# Patient Record
Sex: FEMALE | Race: WHITE | NOT HISPANIC OR LATINO | Employment: UNEMPLOYED | URBAN - NONMETROPOLITAN AREA
[De-identification: names, ages, dates, MRNs, and addresses within clinical notes are randomized per-mention and may not be internally consistent; named-entity substitution may affect disease eponyms.]

---

## 2017-02-04 ENCOUNTER — HOSPITAL ENCOUNTER (OUTPATIENT)
Facility: HOSPITAL | Age: 52
Setting detail: OBSERVATION
Discharge: PSYCHIATRIC OR SUBSTANCE USE FACILITY | End: 2017-02-05
Attending: EMERGENCY MEDICINE | Admitting: HOSPITALIST

## 2017-02-04 ENCOUNTER — APPOINTMENT (OUTPATIENT)
Dept: CT IMAGING | Facility: HOSPITAL | Age: 52
End: 2017-02-04

## 2017-02-04 DIAGNOSIS — T50.904A OVERDOSE, UNDETERMINED INTENT, INITIAL ENCOUNTER: Primary | ICD-10-CM

## 2017-02-04 PROBLEM — T50.901A OVERDOSE: Status: ACTIVE | Noted: 2017-02-04

## 2017-02-04 LAB
ALBUMIN SERPL-MCNC: 4.2 G/DL (ref 3.4–4.8)
ALBUMIN/GLOB SERPL: 1.4 G/DL (ref 1.1–1.8)
ALP SERPL-CCNC: 85 U/L (ref 38–126)
ALT SERPL W P-5'-P-CCNC: 27 U/L (ref 9–52)
AMPHET+METHAMPHET UR QL: NEGATIVE
ANION GAP SERPL CALCULATED.3IONS-SCNC: 13 MMOL/L (ref 5–15)
APAP SERPL-MCNC: <10 MCG/ML (ref 10–30)
AST SERPL-CCNC: 28 U/L (ref 14–36)
BARBITURATES UR QL SCN: NEGATIVE
BASOPHILS # BLD AUTO: 0.08 10*3/MM3 (ref 0–0.2)
BASOPHILS NFR BLD AUTO: 0.7 % (ref 0–2)
BENZODIAZ UR QL SCN: POSITIVE
BILIRUB SERPL-MCNC: 0.5 MG/DL (ref 0.2–1.3)
BUN BLD-MCNC: 9 MG/DL (ref 7–21)
BUN/CREAT SERPL: 10.2 (ref 7–25)
CALCIUM SPEC-SCNC: 9 MG/DL (ref 8.4–10.2)
CANNABINOIDS SERPL QL: NEGATIVE
CHLORIDE SERPL-SCNC: 98 MMOL/L (ref 95–110)
CO2 SERPL-SCNC: 23 MMOL/L (ref 22–31)
COCAINE UR QL: NEGATIVE
CREAT BLD-MCNC: 0.88 MG/DL (ref 0.5–1)
DEPRECATED RDW RBC AUTO: 43.5 FL (ref 36.4–46.3)
EOSINOPHIL # BLD AUTO: 0.24 10*3/MM3 (ref 0–0.7)
EOSINOPHIL NFR BLD AUTO: 2.2 % (ref 0–7)
ERYTHROCYTE [DISTWIDTH] IN BLOOD BY AUTOMATED COUNT: 13.7 % (ref 11.5–14.5)
ETHANOL BLD-MCNC: <10 MG/DL (ref 0–0)
ETHANOL UR QL: <0.01 %
GFR SERPL CREATININE-BSD FRML MDRD: 68 ML/MIN/1.73 (ref 51–120)
GLOBULIN UR ELPH-MCNC: 3 GM/DL (ref 2.3–3.5)
GLUCOSE BLD-MCNC: 110 MG/DL (ref 60–100)
HCT VFR BLD AUTO: 36.8 % (ref 35–45)
HGB BLD-MCNC: 13.2 G/DL (ref 12–15.5)
HOLD SPECIMEN: NORMAL
HOLD SPECIMEN: NORMAL
IMM GRANULOCYTES # BLD: 0.04 10*3/MM3 (ref 0–0.02)
IMM GRANULOCYTES NFR BLD: 0.4 % (ref 0–0.5)
LYMPHOCYTES # BLD AUTO: 2.64 10*3/MM3 (ref 0.6–4.2)
LYMPHOCYTES NFR BLD AUTO: 24.4 % (ref 10–50)
MCH RBC QN AUTO: 31.1 PG (ref 26.5–34)
MCHC RBC AUTO-ENTMCNC: 35.9 G/DL (ref 31.4–36)
MCV RBC AUTO: 86.8 FL (ref 80–98)
METHADONE UR QL SCN: NEGATIVE
MONOCYTES # BLD AUTO: 0.67 10*3/MM3 (ref 0–0.9)
MONOCYTES NFR BLD AUTO: 6.2 % (ref 0–12)
NEUTROPHILS # BLD AUTO: 7.17 10*3/MM3 (ref 2–8.6)
NEUTROPHILS NFR BLD AUTO: 66.1 % (ref 37–80)
OPIATES UR QL: NEGATIVE
OXYCODONE UR QL SCN: NEGATIVE
PLATELET # BLD AUTO: 224 10*3/MM3 (ref 150–450)
PMV BLD AUTO: 11.4 FL (ref 8–12)
POTASSIUM BLD-SCNC: 3.5 MMOL/L (ref 3.5–5.1)
PROT SERPL-MCNC: 7.2 G/DL (ref 6.3–8.6)
RBC # BLD AUTO: 4.24 10*6/MM3 (ref 3.77–5.16)
SALICYLATES SERPL-MCNC: <1 MG/DL (ref 10–20)
SODIUM BLD-SCNC: 134 MMOL/L (ref 137–145)
WBC NRBC COR # BLD: 10.84 10*3/MM3 (ref 3.2–9.8)
WHOLE BLOOD HOLD SPECIMEN: NORMAL
WHOLE BLOOD HOLD SPECIMEN: NORMAL

## 2017-02-04 PROCEDURE — G0378 HOSPITAL OBSERVATION PER HR: HCPCS

## 2017-02-04 PROCEDURE — 96361 HYDRATE IV INFUSION ADD-ON: CPT

## 2017-02-04 PROCEDURE — 96360 HYDRATION IV INFUSION INIT: CPT

## 2017-02-04 PROCEDURE — 70450 CT HEAD/BRAIN W/O DYE: CPT

## 2017-02-04 PROCEDURE — 80307 DRUG TEST PRSMV CHEM ANLYZR: CPT | Performed by: EMERGENCY MEDICINE

## 2017-02-04 PROCEDURE — 85025 COMPLETE CBC W/AUTO DIFF WBC: CPT | Performed by: EMERGENCY MEDICINE

## 2017-02-04 PROCEDURE — 80053 COMPREHEN METABOLIC PANEL: CPT | Performed by: EMERGENCY MEDICINE

## 2017-02-04 PROCEDURE — 94770: CPT

## 2017-02-04 PROCEDURE — 81001 URINALYSIS AUTO W/SCOPE: CPT | Performed by: EMERGENCY MEDICINE

## 2017-02-04 PROCEDURE — 87077 CULTURE AEROBIC IDENTIFY: CPT | Performed by: EMERGENCY MEDICINE

## 2017-02-04 PROCEDURE — 99285 EMERGENCY DEPT VISIT HI MDM: CPT

## 2017-02-04 PROCEDURE — 87186 SC STD MICRODIL/AGAR DIL: CPT | Performed by: EMERGENCY MEDICINE

## 2017-02-04 PROCEDURE — 87086 URINE CULTURE/COLONY COUNT: CPT | Performed by: EMERGENCY MEDICINE

## 2017-02-04 PROCEDURE — P9612 CATHETERIZE FOR URINE SPEC: HCPCS

## 2017-02-04 RX ORDER — SODIUM CHLORIDE 0.9 % (FLUSH) 0.9 %
10 SYRINGE (ML) INJECTION AS NEEDED
Status: DISCONTINUED | OUTPATIENT
Start: 2017-02-04 | End: 2017-02-05 | Stop reason: SDUPTHER

## 2017-02-04 RX ORDER — SODIUM CHLORIDE 9 MG/ML
125 INJECTION, SOLUTION INTRAVENOUS CONTINUOUS
Status: DISCONTINUED | OUTPATIENT
Start: 2017-02-04 | End: 2017-02-05 | Stop reason: HOSPADM

## 2017-02-04 RX ADMIN — SODIUM CHLORIDE 125 ML/HR: 9 INJECTION, SOLUTION INTRAVENOUS at 17:59

## 2017-02-04 NOTE — ED NOTES
Pt presents to the ED with AMS.  Family reported to EMS that they found pt on floor incontinent of urine. Family reports that she may have overdosed on some medicines.  Pt had 30 xanax filled on the January 31st and 30 Somas filled on February 3rd.  Pt has 4 Somas left and the Xanax bottle was empty.      Jyoti Garcia RN  02/04/17 4563

## 2017-02-04 NOTE — ED PROVIDER NOTES
Subjective   Patient is a 51 y.o. female presenting with ingestion.   Ingestion   Ingested substance:  Prescription medication  Time since incident: exact time unknown, but occurred today as pt found on the floor of her house incontinent by family.  Witnesses present: no    Called poison control: no    Incident location:  Home  Context: intentional ingestion    Context comment:  Pt was found on the floor of her house by family , unresponsive and incontinent of urine  Associated symptoms: altered mental status, lethargy, slurred speech and unresponsiveness    Associated symptoms: no agitation, no diaphoresis, no nausea, no shortness of breath and no vomiting        Review of Systems   Constitutional: Positive for activity change. Negative for diaphoresis.   HENT: Negative.    Eyes: Negative.    Respiratory: Negative.  Negative for shortness of breath.    Cardiovascular: Negative.    Gastrointestinal: Negative.  Negative for nausea and vomiting.   Musculoskeletal: Negative.    Skin: Negative.    Neurological: Positive for speech difficulty.   Psychiatric/Behavioral: Positive for confusion. Negative for agitation and suicidal ideas.       Past Medical History   Diagnosis Date   • Anxiety    • Depression    • Hypertension    • Menopause        Allergies   Allergen Reactions   • Demerol [Meperidine]    • Tramadol        Past Surgical History   Procedure Laterality Date   • Tonsillectomy     • Tubal abdominal ligation     • Bladder surgery       bladder stretched as a child       Family History   Problem Relation Age of Onset   • Chronic infections Mother      became delirious--septic   • Diabetes Mother    • Bipolar disorder Mother    • Transient ischemic attack [facility]      [facility] [place]   • Alcohol abuse Father    • Seizures Father    • Dementia Father    • Cancer Maternal Grandmother    • Stroke Paternal Grandmother    • Diabetes Paternal Grandmother    • Heart disease Paternal Grandfather    • Lung cancer  Maternal Uncle    • Lung cancer Maternal Aunt    • Drug abuse Sister      Accidental overdose       Social History     Social History   • Marital status:      Spouse name: Randy   • Number of children: 1   • Years of education: 12     Occupational History   • disabled      Social History Main Topics   • Smoking status: Current Every Day Smoker     Packs/day: 1.00     Years: 36.00     Types: Cigarettes   • Smokeless tobacco: Never Used   • Alcohol use No      Comment: Last use of alcohol 2006   • Drug use: No      Comment: Marijuana and amphetamines in high school . In high school took grandfather's valium.   • Sexual activity: Yes     Partners: Male     Other Topics Concern   • None     Social History Narrative    Has had one AI and one DUI           Objective   Physical Exam   Constitutional: She appears well-developed and well-nourished. She appears lethargic.   HENT:   Head: Normocephalic and atraumatic.   Mouth/Throat: Oropharynx is clear and moist.   Eyes: Conjunctivae and EOM are normal. Pupils are equal, round, and reactive to light.   Neck: Normal range of motion. Neck supple.   Cardiovascular: Normal rate, regular rhythm and normal heart sounds.  Exam reveals no gallop and no friction rub.    No murmur heard.  Pulmonary/Chest: Effort normal and breath sounds normal. She has no wheezes. She has no rales.   Abdominal: Soft. Bowel sounds are normal. She exhibits no mass. There is no tenderness. There is no rebound and no guarding.   Musculoskeletal: Normal range of motion. She exhibits no tenderness.   Neurological: She has normal strength and normal reflexes. She appears lethargic. She is disoriented. She displays tremor. A sensory deficit is present. No cranial nerve deficit. Coordination and gait abnormal. GCS eye subscore is 4. GCS verbal subscore is 3. GCS motor subscore is 5.   Skin: Skin is warm and dry.   Psychiatric: Her affect is blunt. Her speech is slurred. She is inattentive.   Nursing  note and vitals reviewed.      Procedures         ED Course  ED Course     Labs Reviewed   COMPREHENSIVE METABOLIC PANEL - Abnormal; Notable for the following:        Result Value    Glucose 110 (*)     Sodium 134 (*)     All other components within normal limits   ACETAMINOPHEN LEVEL - Abnormal; Notable for the following:     Acetaminophen <10.0 (*)     All other components within normal limits   ETHANOL - Abnormal; Notable for the following:     Ethanol <10 (*)     All other components within normal limits   SALICYLATE LEVEL - Abnormal; Notable for the following:     Salicylate <1.0 (*)     All other components within normal limits   URINE DRUG SCREEN - Abnormal; Notable for the following:     Benzodiazepine Screen, Urine Positive (*)     All other components within normal limits    Narrative:     Negative Thresholds For Drugs Screened in Urine:     Amphetamines          500 ng/ml  Barbiturates          200 ng/ml  Benzodiazepines       200 ng/ml  Cocaine               150 ng/ml  Methadone             300 ng/mL  Opiates               300 ng/mL  Oxycodone             100 ng/mL  THC                   20 ng/mL    The normal value for all drugs tested is negative. This report includes final unconfirmed screening results.  A positive result by this assay can be, at your request, sent to the Reference Lab for confirmation by gas chromatography. Unconfirmed results must not be used for non-medical purposes, such as employment or legal testing. Clinical consideration should be applied to any drug of abuse test result, particularly when unconfirmed results are used.   CBC WITH AUTO DIFFERENTIAL - Abnormal; Notable for the following:     WBC 10.84 (*)     Immature Grans, Absolute 0.04 (*)     All other components within normal limits   URINALYSIS W/ CULTURE IF INDICATED - Abnormal; Notable for the following:     Appearance, UA Cloudy (*)     Ketones, UA Trace (*)     Bilirubin, UA Small (1+) (*)     Protein, UA Trace (*)      Leuk Esterase, UA Moderate (2+) (*)     Nitrite, UA Positive (*)     All other components within normal limits   BASIC METABOLIC PANEL - Abnormal; Notable for the following:     BUN 6 (*)     Potassium 2.9 (*)     Chloride 112 (*)     CO2 19.0 (*)     Calcium 6.2 (*)     All other components within normal limits   CBC WITH AUTO DIFFERENTIAL - Abnormal; Notable for the following:     RBC 3.45 (*)     Hemoglobin 10.5 (*)     Hematocrit 29.8 (*)     nRBC 0.3 (*)     All other components within normal limits   URINALYSIS, MICROSCOPIC ONLY - Abnormal; Notable for the following:     Bacteria, UA 2+ (*)     All other components within normal limits   SCAN SLIDE - Normal   URINE CULTURE   RAINBOW DRAW    Narrative:     The following orders were created for panel order [place] Draw.  Procedure                               Abnormality         Status                     ---------                               -----------         ------                     Light Blue Top[[id number]]                                    Final result               Green Top (Gel)[[id number]]                                   Final result               Lavender Top[[id number]]                                      Final result               Gold Top - SST[[id number]]                                    Final result                 Please view results for these tests on the individual orders.   CBC AND DIFFERENTIAL    Narrative:     The following orders were created for panel order CBC & Differential.  Procedure                               Abnormality         Status                     ---------                               -----------         ------                     CBC Auto Differential[[id number]]         Abnormal            Final result                 Please view results for these tests on the individual orders.   LIGHT BLUE TOP   GREEN TOP   LAVENDER TOP   GOLD TOP - SST       CT Head Without Contrast   Final Result   No CT evidence of acute  intracranial hemorrhage.      Electronically signed by:  Matilde Albert MD  2/4/2017 6:47 PM CST         spoke wit Dr Landeros, pt to be admitted for observation           MDM    Final diagnoses:   Overdose, undetermined intent, initial encounter            Lewis Felix MD  02/05/17 8584

## 2017-02-05 ENCOUNTER — HOSPITAL ENCOUNTER (INPATIENT)
Facility: HOSPITAL | Age: 52
LOS: 4 days | Discharge: HOME | End: 2017-02-09
Attending: HOSPITALIST | Admitting: PSYCHIATRY & NEUROLOGY

## 2017-02-05 VITALS
RESPIRATION RATE: 18 BRPM | WEIGHT: 187.25 LBS | HEART RATE: 79 BPM | OXYGEN SATURATION: 96 % | SYSTOLIC BLOOD PRESSURE: 140 MMHG | HEIGHT: 66 IN | TEMPERATURE: 97.9 F | DIASTOLIC BLOOD PRESSURE: 83 MMHG | BODY MASS INDEX: 30.09 KG/M2

## 2017-02-05 PROBLEM — T50.902A SUICIDAL OVERDOSE (HCC): Status: ACTIVE | Noted: 2017-02-05

## 2017-02-05 PROBLEM — F40.10 SOCIAL ANXIETY DISORDER: Status: ACTIVE | Noted: 2017-02-05

## 2017-02-05 PROBLEM — F33.2 SEVERE EPISODE OF RECURRENT MAJOR DEPRESSIVE DISORDER, WITHOUT PSYCHOTIC FEATURES (HCC): Status: ACTIVE | Noted: 2017-02-05

## 2017-02-05 LAB
ANION GAP SERPL CALCULATED.3IONS-SCNC: 6 MMOL/L (ref 5–15)
BACTERIA UR QL AUTO: ABNORMAL /HPF
BASOPHILS # BLD AUTO: 0.04 10*3/MM3 (ref 0–0.2)
BASOPHILS NFR BLD AUTO: 0.5 % (ref 0–2)
BILIRUB UR QL STRIP: ABNORMAL
BUN BLD-MCNC: 6 MG/DL (ref 7–21)
BUN/CREAT SERPL: 10 (ref 7–25)
CALCIUM SPEC-SCNC: 6.2 MG/DL (ref 8.4–10.2)
CHLORIDE SERPL-SCNC: 112 MMOL/L (ref 95–110)
CLARITY UR: ABNORMAL
CO2 SERPL-SCNC: 19 MMOL/L (ref 22–31)
COLOR UR: ABNORMAL
CREAT BLD-MCNC: 0.6 MG/DL (ref 0.5–1)
DEPRECATED RDW RBC AUTO: 43.5 FL (ref 36.4–46.3)
EOSINOPHIL # BLD AUTO: 0.18 10*3/MM3 (ref 0–0.7)
EOSINOPHIL NFR BLD AUTO: 2.3 % (ref 0–7)
ERYTHROCYTE [DISTWIDTH] IN BLOOD BY AUTOMATED COUNT: 13.7 % (ref 11.5–14.5)
GFR SERPL CREATININE-BSD FRML MDRD: 105 ML/MIN/1.73 (ref 60–120)
GLUCOSE BLD-MCNC: 69 MG/DL (ref 60–100)
GLUCOSE UR STRIP-MCNC: NEGATIVE MG/DL
HCT VFR BLD AUTO: 29.8 % (ref 35–45)
HGB BLD-MCNC: 10.5 G/DL (ref 12–15.5)
HGB UR QL STRIP.AUTO: NEGATIVE
HYALINE CASTS UR QL AUTO: ABNORMAL /LPF
IMM GRANULOCYTES # BLD: 0.02 10*3/MM3 (ref 0–0.02)
IMM GRANULOCYTES NFR BLD: 0.3 % (ref 0–0.5)
KETONES UR QL STRIP: ABNORMAL
LEUKOCYTE ESTERASE UR QL STRIP.AUTO: ABNORMAL
LYMPHOCYTES # BLD AUTO: 1.51 10*3/MM3 (ref 0.6–4.2)
LYMPHOCYTES NFR BLD AUTO: 18.9 % (ref 10–50)
MCH RBC QN AUTO: 30.4 PG (ref 26.5–34)
MCHC RBC AUTO-ENTMCNC: 35.2 G/DL (ref 31.4–36)
MCV RBC AUTO: 86.4 FL (ref 80–98)
MONOCYTES # BLD AUTO: 0.39 10*3/MM3 (ref 0–0.9)
MONOCYTES NFR BLD AUTO: 4.9 % (ref 0–12)
NEUTROPHILS # BLD AUTO: 5.86 10*3/MM3 (ref 2–8.6)
NEUTROPHILS NFR BLD AUTO: 73.1 % (ref 37–80)
NITRITE UR QL STRIP: POSITIVE
NRBC BLD MANUAL-RTO: 0.3 /100 WBC (ref 0–0)
PH UR STRIP.AUTO: 7 [PH] (ref 5–9)
PLAT MORPH BLD: NORMAL
PLATELET # BLD AUTO: 187 10*3/MM3 (ref 150–450)
PMV BLD AUTO: 11.7 FL (ref 8–12)
POTASSIUM BLD-SCNC: 2.9 MMOL/L (ref 3.5–5.1)
PROT UR QL STRIP: ABNORMAL
RBC # BLD AUTO: 3.45 10*6/MM3 (ref 3.77–5.16)
RBC # UR: ABNORMAL /HPF
RBC MORPH BLD: NORMAL
REF LAB TEST METHOD: ABNORMAL
SODIUM BLD-SCNC: 137 MMOL/L (ref 137–145)
SP GR UR STRIP: 1.03 (ref 1–1.03)
SQUAMOUS #/AREA URNS HPF: ABNORMAL /HPF
UROBILINOGEN UR QL STRIP: ABNORMAL
WBC MORPH BLD: NORMAL
WBC NRBC COR # BLD: 8 10*3/MM3 (ref 3.2–9.8)
WBC UR QL AUTO: ABNORMAL /HPF

## 2017-02-05 PROCEDURE — 99233 SBSQ HOSP IP/OBS HIGH 50: CPT | Performed by: NURSE PRACTITIONER

## 2017-02-05 PROCEDURE — 96361 HYDRATE IV INFUSION ADD-ON: CPT

## 2017-02-05 PROCEDURE — 96372 THER/PROPH/DIAG INJ SC/IM: CPT

## 2017-02-05 PROCEDURE — 85025 COMPLETE CBC W/AUTO DIFF WBC: CPT | Performed by: HOSPITALIST

## 2017-02-05 PROCEDURE — 85007 BL SMEAR W/DIFF WBC COUNT: CPT | Performed by: HOSPITALIST

## 2017-02-05 PROCEDURE — 80048 BASIC METABOLIC PNL TOTAL CA: CPT | Performed by: HOSPITALIST

## 2017-02-05 PROCEDURE — 25010000002 ENOXAPARIN PER 10 MG: Performed by: HOSPITALIST

## 2017-02-05 PROCEDURE — G0378 HOSPITAL OBSERVATION PER HR: HCPCS

## 2017-02-05 RX ORDER — NICOTINE 21 MG/24HR
1 PATCH, TRANSDERMAL 24 HOURS TRANSDERMAL EVERY 24 HOURS
Qty: 1 PATCH | Refills: 0 | Status: SHIPPED | OUTPATIENT
Start: 2017-02-05 | End: 2017-02-09 | Stop reason: HOSPADM

## 2017-02-05 RX ORDER — MOMETASONE FUROATE 50 UG/1
SPRAY, METERED NASAL
COMMUNITY
Start: 2015-08-17 | End: 2020-01-08 | Stop reason: ALTCHOICE

## 2017-02-05 RX ORDER — IBUPROFEN 600 MG/1
TABLET ORAL
COMMUNITY
Start: 2016-09-02 | End: 2020-02-03 | Stop reason: SINTOL

## 2017-02-05 RX ORDER — GABAPENTIN 600 MG/1
300 TABLET ORAL 3 TIMES DAILY
Qty: 60 TABLET | Refills: 0 | Status: SHIPPED | OUTPATIENT
Start: 2017-02-05 | End: 2017-02-09 | Stop reason: HOSPADM

## 2017-02-05 RX ORDER — FLUOXETINE HYDROCHLORIDE 40 MG/1
CAPSULE ORAL
COMMUNITY
Start: 2015-06-08 | End: 2017-02-09 | Stop reason: HOSPADM

## 2017-02-05 RX ORDER — LISINOPRIL 10 MG/1
20 TABLET ORAL
Status: ON HOLD | COMMUNITY
Start: 2016-09-02

## 2017-02-05 RX ORDER — ALPRAZOLAM 0.5 MG/1
0.5 TABLET ORAL DAILY
COMMUNITY
Start: 2017-01-03 | End: 2017-02-05 | Stop reason: HOSPADM

## 2017-02-05 RX ORDER — SODIUM CHLORIDE 0.9 % (FLUSH) 0.9 %
1-10 SYRINGE (ML) INJECTION AS NEEDED
Status: DISCONTINUED | OUTPATIENT
Start: 2017-02-05 | End: 2017-02-05 | Stop reason: HOSPADM

## 2017-02-05 RX ORDER — METOPROLOL SUCCINATE 25 MG/1
TABLET, EXTENDED RELEASE ORAL
Status: ON HOLD | COMMUNITY
Start: 2016-09-02

## 2017-02-05 RX ORDER — NICOTINE 21 MG/24HR
1 PATCH, TRANSDERMAL 24 HOURS TRANSDERMAL EVERY 24 HOURS
Status: DISCONTINUED | OUTPATIENT
Start: 2017-02-05 | End: 2017-02-05 | Stop reason: HOSPADM

## 2017-02-05 RX ORDER — HYDROXYZINE PAMOATE 50 MG/1
50 CAPSULE ORAL EVERY 6 HOURS PRN
Status: DISCONTINUED | OUTPATIENT
Start: 2017-02-05 | End: 2017-02-09 | Stop reason: HOSPADM

## 2017-02-05 RX ORDER — NICOTINE 21 MG/24HR
1 PATCH, TRANSDERMAL 24 HOURS TRANSDERMAL EVERY 24 HOURS
Status: DISCONTINUED | OUTPATIENT
Start: 2017-02-06 | End: 2017-02-09 | Stop reason: HOSPADM

## 2017-02-05 RX ORDER — MAGNESIUM HYDROXIDE/ALUMINUM HYDROXICE/SIMETHICONE 120; 1200; 1200 MG/30ML; MG/30ML; MG/30ML
30 SUSPENSION ORAL EVERY 6 HOURS PRN
Status: DISCONTINUED | OUTPATIENT
Start: 2017-02-05 | End: 2017-02-09 | Stop reason: HOSPADM

## 2017-02-05 RX ORDER — CARISOPRODOL 350 MG/1
350 TABLET ORAL
COMMUNITY
Start: 2017-01-03 | End: 2017-02-05 | Stop reason: HOSPADM

## 2017-02-05 RX ORDER — PROMETHAZINE HYDROCHLORIDE 25 MG/1
25 TABLET ORAL EVERY 6 HOURS
Status: ON HOLD | COMMUNITY
Start: 2015-07-14

## 2017-02-05 RX ORDER — GABAPENTIN 800 MG/1
TABLET ORAL
Status: ON HOLD | COMMUNITY
Start: 2016-09-02 | End: 2017-02-05

## 2017-02-05 RX ORDER — OMEPRAZOLE 40 MG/1
CAPSULE, DELAYED RELEASE ORAL
Status: ON HOLD | COMMUNITY
Start: 2016-09-02

## 2017-02-05 RX ORDER — ACETAMINOPHEN 325 MG/1
650 TABLET ORAL EVERY 4 HOURS PRN
Status: DISCONTINUED | OUTPATIENT
Start: 2017-02-05 | End: 2017-02-09 | Stop reason: SDUPTHER

## 2017-02-05 RX ORDER — TRAZODONE HYDROCHLORIDE 50 MG/1
50 TABLET ORAL NIGHTLY PRN
Status: DISCONTINUED | OUTPATIENT
Start: 2017-02-05 | End: 2017-02-09 | Stop reason: HOSPADM

## 2017-02-05 RX ADMIN — SODIUM CHLORIDE 125 ML/HR: 9 INJECTION, SOLUTION INTRAVENOUS at 06:55

## 2017-02-05 RX ADMIN — POTASSIUM CHLORIDE 30 MEQ: 750 TABLET, EXTENDED RELEASE ORAL at 20:29

## 2017-02-05 RX ADMIN — NICOTINE 1 PATCH: 14 PATCH, EXTENDED RELEASE TRANSDERMAL at 15:04

## 2017-02-05 RX ADMIN — ENOXAPARIN SODIUM 40 MG: 40 INJECTION SUBCUTANEOUS at 08:42

## 2017-02-05 NOTE — ED NOTES
Report given to Sandy CRABTREE on floor who states no additional questions at this time.  Resps even and unlabored.  NAD. VSS.  Pt. Unable to go to floor at this time due to the need for a sitter.  Sandy CRABTREE states that a sitter is not available for pt. Until 2300.  ED charge nurse aware.  No other needs voiced at this time.       Brandy Ledesma RN  02/04/17 1500

## 2017-02-05 NOTE — DISCHARGE SUMMARY
"    [facility] Medicine Services  DISCHARGE SUMMARY       Date of Admission: 2/4/2017  Date of Discharge:  2/5/2017  Primary Care Physician: Bang Faust MD    Presenting Problem/History of Present Illness:  Overdose, undetermined intent, initial encounter [T50.513K]       Final Discharge Diagnoses:  Hospital Problem List     Overdose    Severe episode of recurrent major depressive disorder, without psychotic features    Social anxiety disorder          Consults:   Consults     Date and Time Order Name Status Description    2/5/2017 0032 Inpatient Consult to Psychiatrist Completed     2/4/2017 2033 Hospitalist (on-call MD unless specified)                            Hospital Course:  Pt is a 51 y.o. female presenting to the hospital because she was found by family unresponsive with slurred speech and incontinent of urine.  Apparently she has overdosed on Soma and Xanax in excessive quantities. Her intent of taking his medications remain unknown, she was admitted to the hospital for observation currently she is awake alert oriented ×3 and does not appear to be in distress.  She was evaluated by psychiatry who recommended no Soma or Xanax and to decrease her Neurontin to 300 mg 3 times a day and to be transferred to the psychiatry unit once medically stable.  Patient is medically stable and will be transferred to the psychiatry unit.      Physical Exam on Discharge:  Visit Vitals   • /65 (BP Location: Right arm, Patient Position: Lying)   • Pulse 66   • Temp 96.6 °F (35.9 °C) (Temporal Artery )   • Resp 18   • Ht 66\" (167.6 cm)   • Wt 187 lb 4 oz (84.9 kg)   • SpO2 96%   • BMI 30.22 kg/m2     Physical Exam      Discharge Disposition:  Psychiatric Hospital or Unit ([facility])    Discharge Medications:   Lisa Gardner   Home Medication Instructions SOFIA:[id number]    Printed on:02/05/17 1241   Medication Information                      FLUoxetine " (PROzac) 40 MG capsule               gabapentin (NEURONTIN) 600 MG tablet  Take 0.5 tablets by mouth 3 (Three) Times a Day.             ibuprofen (ADVIL,MOTRIN) 600 MG tablet  TAKE 1 TABLET BY MOUTH EVERY SIX HOURS WITH FOOD AS NEEDED FOR PAIN             lisinopril (PRINIVIL,ZESTRIL) 10 MG tablet  TAKE 1 TABLET BY MOUTH DAILY.             metoprolol succinate XL (TOPROL-XL) 25 MG 24 hr tablet  TAKE 1 TABLET BY MOUTH DAILY.             mometasone (NASONEX) 50 MCG/ACT nasal spray  SPRAY 2 SPRAYS IN EACH NOSTRIL EVERY DAY             omeprazole (priLOSEC) 40 MG capsule  TAKE 1 CAPSULE BY MOUTH DAILY.             promethazine (PHENERGAN) 25 MG tablet  Take 25 mg by mouth Every 6 (Six) Hours.                 Discharge Diet:   Diet Instructions     Diet: Regular; Thin Liquids, No Restrictions       Discharge Diet:  Regular   Fluid Consistency:  Thin Liquids, No Restrictions                 Activity at Discharge:   Activity Instructions     Other Instructions (Specify)       Per psychiatry                     Follow-up Appointments:   No future appointments.  Primary care physician in a week  Test Results Pending at Discharge:    Order Current Status    Urine Culture In process          Noreen Lipscomb MD  02/05/17  12:41 PM

## 2017-02-05 NOTE — PLAN OF CARE
Problem: Patient Care Overview (Adult)  Goal: Plan of Care Review  Outcome: Ongoing (interventions implemented as appropriate)    02/05/17 0345   Coping/Psychosocial Response Interventions   Plan Of Care Reviewed With patient   Patient Care Overview   Progress no change   Outcome Evaluation   Outcome Summary/Follow up Plan pt does not wish to talk openly about diagnosis or family situation       Goal: Adult Individualization and Mutuality  Outcome: Ongoing (interventions implemented as appropriate)  Goal: Discharge Needs Assessment  Outcome: Ongoing (interventions implemented as appropriate)    Problem: Suicide Risk (Adult,Obstetrics,Pediatric)  Goal: Identify Related Risk Factors and Signs and Symptoms  Outcome: Ongoing (interventions implemented as appropriate)  Goal: Strength-Based Wellness/Recovery  Outcome: Ongoing (interventions implemented as appropriate)  Goal: Physical Safety  Outcome: Ongoing (interventions implemented as appropriate)    Problem: Fall Risk (Adult)  Goal: Identify Related Risk Factors and Signs and Symptoms  Outcome: Ongoing (interventions implemented as appropriate)  Goal: Absence of Falls  Outcome: Ongoing (interventions implemented as appropriate)    Problem: Pain, Chronic (Adult)  Goal: Identify Related Risk Factors and Signs and Symptoms  Outcome: Ongoing (interventions implemented as appropriate)  Goal: Acceptable Pain Control/Comfort Level  Outcome: Ongoing (interventions implemented as appropriate)

## 2017-02-05 NOTE — H&P
[facility] Medicine Admission      Date of Admission: 2/4/2017      Primary Care Physician: Bang Faust MD    Following information is obtained partially from patient, family and/or medical records.    Chief Complaint:   Chief Complaint   Patient presents with   • Altered Mental Status       HPI: Pt is a 51 y.o. female presenting to the ER because she was found by family unresponsive with slurred speech and incontinent of urine.  She was brought to the ER where further information and indicated the patient had taken Soma and Xanax in excessive quantities.  Her intent of taking his medications remain unknown.  The exact time it was indigestion is also unknown.  History is limited due to patient's current mentation.       Concurrent Medical History:   Patient Active Problem List   Diagnosis   • Essential hypertension   • Precordial pain   • Overdose   • Anxiety state   • Bipolar affective disorder       Past Surgical History: History reviewed. No pertinent past surgical history.    Family History: family history is not on file.    Social History:  reports that she has been smoking Cigarettes.  She has never used smokeless tobacco. She reports that she does not drink alcohol or use illicit drugs.    Allergies:   Allergies   Allergen Reactions   • Demerol [Meperidine]    • Tramadol        Home Medications:   Prior to Admission medications    Medication Sig Start Date End Date Taking? Authorizing Provider   ALPRAZolam (XANAX) 0.5 MG tablet  8/2/16   Historical Provider, MD   FLUoxetine (PROZAC) 40 MG capsule Take 40 mg by mouth daily.    Historical Provider, MD   gabapentin (NEURONTIN) 800 MG tablet  8/3/16   Historical Provider, MD   lisinopril (PRINIVIL,ZESTRIL) 10 MG tablet Take 10 mg by mouth every night. at bedtime    Historical Provider, MD   metoprolol succinate XL (TOPROL-XL) 25 MG 24 hr tablet  8/3/16   Historical Provider, MD   omeprazole (PRILOSEC) 40 MG  capsule Take 40 mg by mouth daily. Med Name: omeprazole 40 mg capsule,delayed release    Historical Provider, MD       Review of Systems:  Review of Systems   Unable to perform ROS: Mental status change      Otherwise complete ROS is negative except as mentioned above and in HPI.    Physical Exam:   Temp:  [97.3 °F (36.3 °C)-98 °F (36.7 °C)] 98 °F (36.7 °C)  Heart Rate:  [] 71  Resp:  [15-30] 20  BP: (132-192)/() 158/100  Physical Exam   Constitutional: She appears well-developed and well-nourished. She appears listless.   Patient appears slow and listless,   HENT:   Head: Normocephalic and atraumatic.   Nose: Nose normal.   Mouth/Throat: Oropharynx is clear and moist.   Eyes: Conjunctivae are normal. Pupils are equal, round, and reactive to light. No scleral icterus.   Neck: No tracheal deviation present.   Cardiovascular: Normal heart sounds.  Exam reveals no friction rub.    Pulmonary/Chest: Effort normal and breath sounds normal. No respiratory distress. She has no wheezes. She has no rales.   Abdominal: Soft. Bowel sounds are normal. She exhibits no distension. There is no tenderness.   Musculoskeletal: Normal range of motion.   Neurological: She appears listless. GCS eye subscore is 4. GCS verbal subscore is 5. GCS motor subscore is 6.   Due to her mentation a full neurological physical exam is limited   Skin: Skin is warm and dry.         Results Reviewed:  I have personally reviewed current lab, radiology, and data and agree with results.  Lab Results (last 24 hours)     Procedure Component Value Units Date/Time    CBC & Differential [[id number]] Collected:  02/04/17 1700    Specimen:  Blood Updated:  02/04/17 1707    Narrative:       The following orders were created for panel order CBC & Differential.  Procedure                               Abnormality         Status                     ---------                               -----------         ------                     CBC Auto  Differential[[id number]]         Abnormal            Final result                 Please view results for these tests on the individual orders.    CBC Auto Differential [[id number]]  (Abnormal) Collected:  02/04/17 1700    Specimen:  Blood Updated:  02/04/17 1707     WBC 10.84 (H) 10*3/mm3      RBC 4.24 10*6/mm3      Hemoglobin 13.2 g/dL      Hematocrit 36.8 %      MCV 86.8 fL      MCH 31.1 pg      MCHC 35.9 g/dL      RDW 13.7 %      RDW-SD 43.5 fl      MPV 11.4 fL      Platelets 224 10*3/mm3      Neutrophil % 66.1 %      Lymphocyte % 24.4 %      Monocyte % 6.2 %      Eosinophil % 2.2 %      Basophil % 0.7 %      Immature Grans % 0.4 %      Neutrophils, Absolute 7.17 10*3/mm3      Lymphocytes, Absolute 2.64 10*3/mm3      Monocytes, Absolute 0.67 10*3/mm3      Eosinophils, Absolute 0.24 10*3/mm3      Basophils, Absolute 0.08 10*3/mm3      Immature Grans, Absolute 0.04 (H) 10*3/mm3     Ethanol [[id number]]  (Abnormal) Collected:  02/04/17 1700    Specimen:  Blood Updated:  02/04/17 1717     Ethanol <10 (H) mg/dL      Ethanol % <0.010 %     Comprehensive Metabolic Panel [[id number]]  (Abnormal) Collected:  02/04/17 1700    Specimen:  Blood Updated:  02/04/17 1718     Glucose 110 (H) mg/dL      BUN 9 mg/dL      Creatinine 0.88 mg/dL      Sodium 134 (L) mmol/L      Potassium 3.5 mmol/L      Chloride 98 mmol/L      CO2 23.0 mmol/L      Calcium 9.0 mg/dL      Total Protein 7.2 g/dL      Albumin 4.20 g/dL      ALT (SGPT) 27 U/L      AST (SGOT) 28 U/L      Alkaline Phosphatase 85 U/L      Total Bilirubin 0.5 mg/dL      eGFR Non African Amer 68 mL/min/1.73      Globulin 3.0 gm/dL      A/G Ratio 1.4 g/dL      BUN/Creatinine Ratio 10.2      Anion Gap 13.0 mmol/L     Salicylate Level [[id number]]  (Abnormal) Collected:  02/04/17 1700    Specimen:  Blood Updated:  02/04/17 1718     Salicylate <1.0 (L) mg/dL     Acetaminophen Level [[id number]]  (Abnormal) Collected:  02/04/17 1700    Specimen:  Blood Updated:  02/04/17 1721     Acetaminophen  <10.0 (L) mcg/mL     Urine Drug Screen [[id number]]  (Abnormal) Collected:  02/04/17 1716    Specimen:  Urine from Urine, Catheter Updated:  02/04/17 1738     Amphetamine Screen, Urine Negative      Barbiturates Screen, Urine Negative      Benzodiazepine Screen, Urine Positive (A)      Cocaine Screen, Urine Negative      Methadone Screen, Urine Negative      Opiate Screen Negative      Oxycodone Screen, Urine Negative      THC, Screen, Urine Negative     Narrative:       Negative Thresholds For Drugs Screened in Urine:     Amphetamines          500 ng/ml  Barbiturates          200 ng/ml  Benzodiazepines       200 ng/ml  Cocaine               150 ng/ml  Methadone             300 ng/mL  Opiates               300 ng/mL  Oxycodone             100 ng/mL  THC                   20 ng/mL    The normal value for all drugs tested is negative. This report includes final unconfirmed screening results.  A positive result by this assay can be, at your request, sent to the Reference Lab for confirmation by gas chromatography. Unconfirmed results must not be used for non-medical purposes, such as employment or legal testing. Clinical consideration should be applied to any drug of abuse test result, particularly when unconfirmed results are used.    Gold Top - SST [[id number]] Collected:  02/04/17 1700    Specimen:  Blood Updated:  02/04/17 2101     Extra Tube Hold for add-ons.       Auto resulted.       [place] Draw [[id number]] Collected:  02/04/17 1700    Specimen:  Blood Updated:  02/04/17 2101    Narrative:       The following orders were created for panel order [place] Draw.  Procedure                               Abnormality         Status                     ---------                               -----------         ------                     Light Blue Top[[id number]]                                    Final result               Green Top (Gel)[[id number]]                                   Final result               Lavender  Top[[id number]]                                      Final result               Gold Top - SST[[id number]]                                    Final result                 Please view results for these tests on the individual orders.    Light Blue Top [[id number]] Collected:  02/04/17 1700    Specimen:  Blood Updated:  02/04/17 2101     Extra Tube hold for add-on       Auto resulted       Green Top (Gel) [[id number]] Collected:  02/04/17 1700    Specimen:  Blood Updated:  02/04/17 2101     Extra Tube Hold for add-ons.       Auto resulted.       Lavender Top [[id number]] Collected:  02/04/17 1700    Specimen:  Blood Updated:  02/04/17 2101     Extra Tube hold for add-on       Auto resulted           Imaging Results (last 24 hours)     Procedure Component Value Units Date/Time    CT Head Without Contrast [[id number]] Collected:  02/04/17 1844     Updated:  02/04/17 1848    Narrative:       TIME OF PROCEDURE:  2/4/2017 4:21 PM CST    PROCEDURE: CT HEAD WITHOUT IV CONTRAST    INDICATION FOR PROCEDURE:  51 years -old patient presents for evaluation of altered mental status.    TECHNIQUE: Contiguous axial images are obtained of the head without intravenous administration of contrast. Multiplanar reformations are submitted for interpretation.  Dose length product is None.  Images were acquired in accordance with the principles   of ALARA (as low as reasonably allowable.    COMPARISON:  None    FINDINGS: There is normal gray-white differentiation. There is no CT evidence for mass or mass effect. There are no abnormal intra-axial or extra-axial fluid collections. The ventricles have normal size and position.   The basal ganglia, thalami,   brainstem and cerebellum have a normal appearance.    The scalp has a small calcification within the posterior right parietal region. The orbits have a normal unenhanced appearance. Imaged paranasal sinuses and mastoid air cells are clear.  There is no obvious depressed or linear skull fracture.    No  acute infarcts are visible, however, these maybe radiographically occult.      Impression:       No CT evidence of acute intracranial hemorrhage.    Electronically signed by:  Matilde Albert MD  2/4/2017 6:47 PM CST                Assessment and Plan:   1. Overdose: Soma and Xanax, unknown intent.  will place patient on one-on-one, will consult psych.  Supportive care    I discussed the patients findings and my recommendations with:     Wilmer Landeros MD  02/04/17  9:06 PM
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
## 2017-02-05 NOTE — NURSING NOTE
"Behavior   Anxiety 5  Depression 5  Pain 0  AVH no  S/I no  H/I no    Admitted to u via w/c. Overdose with 120 soma ans 30 xanax over 3 days.\"Sherice been on these for 10 yeas.My nerves are shot. My son isnt to happy. I fell all over the place. My bladder went all over.\"oriented to [facility] rules and schedule.        Intervention  Instructed in med usage and effects, encouraged to verbalize needs. Meds administered as ordered.instructed pt to notify staff of feelings of self harm and or concerns. Follow treatment plan          Response  Verbalized understanding           Plan  Continue to monitor for safety/  every 15 minute monitoring checks.  "

## 2017-02-05 NOTE — NURSING NOTE
Pt sitting up in bed. She is alert and making eye contact. She states that prior to hospitalization she took too many pills as an attempt to get high. She states she then was found having a seizure and taken to the ER by her .
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
## 2017-02-06 PROBLEM — T50.901A OVERDOSE: Status: RESOLVED | Noted: 2017-02-04 | Resolved: 2017-02-06

## 2017-02-06 LAB
ANION GAP SERPL CALCULATED.3IONS-SCNC: 11 MMOL/L (ref 5–15)
BASOPHILS # BLD AUTO: 0.07 10*3/MM3 (ref 0–0.2)
BASOPHILS NFR BLD AUTO: 1.2 % (ref 0–2)
BUN BLD-MCNC: 8 MG/DL (ref 7–21)
BUN/CREAT SERPL: 10.7 (ref 7–25)
CALCIUM SPEC-SCNC: 8.9 MG/DL (ref 8.4–10.2)
CHLORIDE SERPL-SCNC: 108 MMOL/L (ref 95–110)
CO2 SERPL-SCNC: 22 MMOL/L (ref 22–31)
CREAT BLD-MCNC: 0.75 MG/DL (ref 0.5–1)
DEPRECATED RDW RBC AUTO: 44.2 FL (ref 36.4–46.3)
EOSINOPHIL # BLD AUTO: 0.22 10*3/MM3 (ref 0–0.7)
EOSINOPHIL NFR BLD AUTO: 3.6 % (ref 0–7)
ERYTHROCYTE [DISTWIDTH] IN BLOOD BY AUTOMATED COUNT: 14 % (ref 11.5–14.5)
GFR SERPL CREATININE-BSD FRML MDRD: 81 ML/MIN/1.73 (ref 51–120)
GLUCOSE BLD-MCNC: 142 MG/DL (ref 60–100)
HCT VFR BLD AUTO: 37.3 % (ref 35–45)
HGB BLD-MCNC: 13.4 G/DL (ref 12–15.5)
IMM GRANULOCYTES # BLD: 0.01 10*3/MM3 (ref 0–0.02)
IMM GRANULOCYTES NFR BLD: 0.2 % (ref 0–0.5)
LYMPHOCYTES # BLD AUTO: 1.67 10*3/MM3 (ref 0.6–4.2)
LYMPHOCYTES NFR BLD AUTO: 27.6 % (ref 10–50)
MCH RBC QN AUTO: 30.9 PG (ref 26.5–34)
MCHC RBC AUTO-ENTMCNC: 35.9 G/DL (ref 31.4–36)
MCV RBC AUTO: 85.9 FL (ref 80–98)
MONOCYTES # BLD AUTO: 0.34 10*3/MM3 (ref 0–0.9)
MONOCYTES NFR BLD AUTO: 5.6 % (ref 0–12)
NEUTROPHILS # BLD AUTO: 3.73 10*3/MM3 (ref 2–8.6)
NEUTROPHILS NFR BLD AUTO: 61.8 % (ref 37–80)
NRBC BLD MANUAL-RTO: 0 /100 WBC (ref 0–0)
PLATELET # BLD AUTO: 208 10*3/MM3 (ref 150–450)
PMV BLD AUTO: 11.5 FL (ref 8–12)
POTASSIUM BLD-SCNC: 3.9 MMOL/L (ref 3.5–5.1)
RBC # BLD AUTO: 4.34 10*6/MM3 (ref 3.77–5.16)
SODIUM BLD-SCNC: 141 MMOL/L (ref 137–145)
WBC NRBC COR # BLD: 6.04 10*3/MM3 (ref 3.2–9.8)

## 2017-02-06 PROCEDURE — 90791 PSYCH DIAGNOSTIC EVALUATION: CPT | Performed by: PSYCHIATRY & NEUROLOGY

## 2017-02-06 PROCEDURE — 99222 1ST HOSP IP/OBS MODERATE 55: CPT | Performed by: FAMILY MEDICINE

## 2017-02-06 PROCEDURE — 85025 COMPLETE CBC W/AUTO DIFF WBC: CPT | Performed by: FAMILY MEDICINE

## 2017-02-06 PROCEDURE — 80048 BASIC METABOLIC PNL TOTAL CA: CPT | Performed by: FAMILY MEDICINE

## 2017-02-06 RX ORDER — IBUPROFEN 600 MG/1
600 TABLET ORAL EVERY 6 HOURS PRN
Status: DISCONTINUED | OUTPATIENT
Start: 2017-02-06 | End: 2017-02-09 | Stop reason: HOSPADM

## 2017-02-06 RX ORDER — ARIPIPRAZOLE 5 MG/1
5 TABLET ORAL DAILY
Status: DISCONTINUED | OUTPATIENT
Start: 2017-02-07 | End: 2017-02-09 | Stop reason: HOSPADM

## 2017-02-06 RX ORDER — GABAPENTIN 400 MG/1
400 CAPSULE ORAL 3 TIMES DAILY
Status: DISCONTINUED | OUTPATIENT
Start: 2017-02-06 | End: 2017-02-09 | Stop reason: HOSPADM

## 2017-02-06 RX ORDER — FLUOXETINE HYDROCHLORIDE 20 MG/1
40 CAPSULE ORAL DAILY
Status: DISCONTINUED | OUTPATIENT
Start: 2017-02-06 | End: 2017-02-09 | Stop reason: HOSPADM

## 2017-02-06 RX ORDER — ARIPIPRAZOLE 2 MG/1
2 TABLET ORAL DAILY
Status: COMPLETED | OUTPATIENT
Start: 2017-02-06 | End: 2017-02-06

## 2017-02-06 RX ORDER — METOPROLOL SUCCINATE 25 MG/1
25 TABLET, EXTENDED RELEASE ORAL
Status: DISCONTINUED | OUTPATIENT
Start: 2017-02-06 | End: 2017-02-09 | Stop reason: HOSPADM

## 2017-02-06 RX ORDER — PANTOPRAZOLE SODIUM 40 MG/1
40 TABLET, DELAYED RELEASE ORAL
Status: DISCONTINUED | OUTPATIENT
Start: 2017-02-07 | End: 2017-02-09 | Stop reason: HOSPADM

## 2017-02-06 RX ORDER — FLUTICASONE PROPIONATE 50 MCG
2 SPRAY, SUSPENSION (ML) NASAL DAILY
Status: DISCONTINUED | OUTPATIENT
Start: 2017-02-06 | End: 2017-02-09 | Stop reason: HOSPADM

## 2017-02-06 RX ORDER — LISINOPRIL 10 MG/1
10 TABLET ORAL
Status: DISCONTINUED | OUTPATIENT
Start: 2017-02-06 | End: 2017-02-09 | Stop reason: HOSPADM

## 2017-02-06 RX ADMIN — LISINOPRIL 10 MG: 10 TABLET ORAL at 09:17

## 2017-02-06 RX ADMIN — FLUOXETINE 40 MG: 20 CAPSULE ORAL at 15:48

## 2017-02-06 RX ADMIN — POTASSIUM CHLORIDE 30 MEQ: 750 TABLET, EXTENDED RELEASE ORAL at 08:56

## 2017-02-06 RX ADMIN — ARIPIPRAZOLE 2 MG: 2 TABLET ORAL at 15:48

## 2017-02-06 RX ADMIN — FLUTICASONE PROPIONATE 2 SPRAY: 50 SPRAY, METERED NASAL at 09:17

## 2017-02-06 RX ADMIN — METOPROLOL SUCCINATE 25 MG: 25 TABLET, EXTENDED RELEASE ORAL at 09:17

## 2017-02-06 RX ADMIN — GABAPENTIN 400 MG: 400 CAPSULE ORAL at 15:48

## 2017-02-06 RX ADMIN — NICOTINE 1 PATCH: 21 PATCH TRANSDERMAL at 08:56

## 2017-02-06 RX ADMIN — ACETAMINOPHEN 650 MG: 325 TABLET ORAL at 05:40

## 2017-02-06 RX ADMIN — TRAZODONE HYDROCHLORIDE 50 MG: 50 TABLET ORAL at 20:16

## 2017-02-06 RX ADMIN — GABAPENTIN 400 MG: 400 CAPSULE ORAL at 20:16

## 2017-02-06 RX ADMIN — IBUPROFEN 600 MG: 600 TABLET ORAL at 17:11

## 2017-02-06 NOTE — NURSING NOTE
"Behavior Pt is resting in bed with eyes opened. When asked how her day has been pt states \"long, it was good, I had visitors.\" When asked why she had to be admitted on [facility] pt states \"I overdosed on Soma but I didn't mean too, I guess I forgot I took it and I took it over and over again.\" Pt denies depression but has a flat affect, quiet, isolating to her room. Pt said she is very tired and has low energy. She said she been up since yesterday. Pt has fair eye contact. Pt rates anxiety a 5/10, when asked what it is related to pt states \"I don't know, I guess all the pills I took is making me shake like this,\" but no tremors observed at this time. Pt denies suicidal/homicidal ideations and said she has had 2 suicide attempts in the past. Pt denies any hallucinations. Pt did not attend group tonight.               Intervention  Instructed in med usage and effects, pt is compliant with all night meds. Encouraged pt to participate in treatment while she is here. Encouraged pt to let staff know of any concerns or problems.         Response  Pt verbalized understanding.           Plan  Will continue to monitor for safety/  every 15 minute monitoring checks and redirect PRN.  "

## 2017-02-06 NOTE — PLAN OF CARE
Problem: BH Patient Care Overview (Adult)  Goal: Plan of Care Review  Outcome: Ongoing (interventions implemented as appropriate)  Goal: Interdisciplinary Rounds/Family Conference  Outcome: Ongoing (interventions implemented as appropriate)  Goal: Individualization and Mutuality  Outcome: Ongoing (interventions implemented as appropriate)  Goal: Discharge Needs Assessment  Outcome: Ongoing (interventions implemented as appropriate)    Problem:  Overarching Goals  Goal: Adheres to Safety Considerations for Self and Others  Outcome: Ongoing (interventions implemented as appropriate)  Goal: Optimized Coping Skills in Response to Life Stressors  Outcome: Ongoing (interventions implemented as appropriate)  Goal: Develops/Participates in Therapeutic [place] to Support Successful Transition  Outcome: Ongoing (interventions implemented as appropriate)

## 2017-02-06 NOTE — PLAN OF CARE
Problem: BH Patient Care Overview (Adult)  Goal: Plan of Care Review  Outcome: Ongoing (interventions implemented as appropriate)    02/06/17 0521   Coping/Psychosocial Response Interventions   Plan Of Care Reviewed With patient       Goal: Individualization and Mutuality  Outcome: Ongoing (interventions implemented as appropriate)  Goal: Discharge Needs Assessment  Outcome: Ongoing (interventions implemented as appropriate)    Problem:  Overarching Goals  Goal: Adheres to Safety Considerations for Self and Others  Outcome: Ongoing (interventions implemented as appropriate)  Goal: Optimized Coping Skills in Response to Life Stressors  Outcome: Ongoing (interventions implemented as appropriate)  Goal: Develops/Participates in Therapeutic [place] to Support Successful Transition  Outcome: Ongoing (interventions implemented as appropriate)

## 2017-02-06 NOTE — NURSING NOTE
Behavior Pt is resting in bed with back to the door. No s/s of distress noted. Pt has rested quietly throughout the night.          Intervention          Response            Plan  Continue to monitor for safety/  every 15 minute monitoring checks and redirect PRN.

## 2017-02-06 NOTE — NURSING NOTE
Behavior   Anxiety 3  Depression 3  Pain 0  AVH no  S/I no  H/I no    Dr chand/ henrietta cohen  notified of k 2.9        Intervention          Response            Plan

## 2017-02-06 NOTE — NURSING NOTE
"Behavior   Anxiety 10  Depression 7  Pain 0  AVH no  SI no   HI No             Intervention Talked 1:1 with pt.         Response  She reports being here for an \"overdose\". She states \"I will never do that again\". Pt denies wanting to harm herself or others. She is very pleasant and talkative this morning, answering all questions appropriately. Encouraged to let staff know if she needs anything at all.           Plan  Continue to monitor for safety/  every 15 minute monitoring checks. She verbalized understanding.   "
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
## 2017-02-07 LAB
BACTERIA SPEC AEROBE CULT: ABNORMAL
BETA LACTAMASE: ABNORMAL
TSH SERPL DL<=0.05 MIU/L-ACNC: 6.22 MIU/ML (ref 0.46–4.68)

## 2017-02-07 PROCEDURE — 99232 SBSQ HOSP IP/OBS MODERATE 35: CPT | Performed by: NURSE PRACTITIONER

## 2017-02-07 PROCEDURE — 84443 ASSAY THYROID STIM HORMONE: CPT | Performed by: PSYCHIATRY & NEUROLOGY

## 2017-02-07 RX ADMIN — IBUPROFEN 600 MG: 600 TABLET ORAL at 21:30

## 2017-02-07 RX ADMIN — LISINOPRIL 10 MG: 10 TABLET ORAL at 08:52

## 2017-02-07 RX ADMIN — METOPROLOL SUCCINATE 25 MG: 25 TABLET, EXTENDED RELEASE ORAL at 08:52

## 2017-02-07 RX ADMIN — ARIPIPRAZOLE 5 MG: 5 TABLET ORAL at 08:52

## 2017-02-07 RX ADMIN — FLUTICASONE PROPIONATE 2 SPRAY: 50 SPRAY, METERED NASAL at 08:55

## 2017-02-07 RX ADMIN — FLUOXETINE 40 MG: 20 CAPSULE ORAL at 08:51

## 2017-02-07 RX ADMIN — IBUPROFEN 600 MG: 600 TABLET ORAL at 09:43

## 2017-02-07 RX ADMIN — GABAPENTIN 400 MG: 400 CAPSULE ORAL at 16:44

## 2017-02-07 RX ADMIN — ACETAMINOPHEN 650 MG: 325 TABLET ORAL at 06:23

## 2017-02-07 RX ADMIN — NICOTINE 1 PATCH: 21 PATCH TRANSDERMAL at 08:52

## 2017-02-07 RX ADMIN — GABAPENTIN 400 MG: 400 CAPSULE ORAL at 08:52

## 2017-02-07 RX ADMIN — PANTOPRAZOLE SODIUM 40 MG: 40 TABLET, DELAYED RELEASE ORAL at 06:23

## 2017-02-07 RX ADMIN — GABAPENTIN 400 MG: 400 CAPSULE ORAL at 21:26

## 2017-02-07 RX ADMIN — TRAZODONE HYDROCHLORIDE 50 MG: 50 TABLET ORAL at 21:56

## 2017-02-07 NOTE — PLAN OF CARE
Problem: BH Patient Care Overview (Adult)  Goal: Plan of Care Review  Outcome: Ongoing (interventions implemented as appropriate)    02/07/17 0546   Coping/Psychosocial Response Interventions   Plan Of Care Reviewed With patient   Coping/Psychosocial   Patient Agreement with Plan of Care agrees   Patient Care Overview   Progress no change   Outcome Evaluation   Outcome Summary/Follow up Plan Pt still have some anxiety, reports racing thoughts, withdrawn, isolate, little interactions with peers.       Goal: Individualization and Mutuality  Outcome: Ongoing (interventions implemented as appropriate)  Goal: Discharge Needs Assessment  Outcome: Ongoing (interventions implemented as appropriate)    Problem: BH Overarching Goals  Goal: Adheres to Safety Considerations for Self and Others  Outcome: Ongoing (interventions implemented as appropriate)  Goal: Optimized Coping Skills in Response to Life Stressors  Outcome: Ongoing (interventions implemented as appropriate)  Goal: Develops/Participates in Therapeutic [place] to Support Successful Transition  Outcome: Ongoing (interventions implemented as appropriate)

## 2017-02-07 NOTE — NURSING NOTE
Behavior   Anxiety 1  Depression 0  Pain 5  AVH no  S/I no  H/I no   Pt has attended all groups and has been cooperative. Pt still c/o chronic bilateral foot/ankle pain. Pt reports she came in because she was sitting at her house depressed and just started taking her meds, multiple at a time. We discussed better coping methods.            Intervention  Instructed in med usage and effects, encouraged to verbalize needs. Meds administered as ordered.          Response  Verbalized understanding.           Plan  Continue to monitor for safety/  every 15 minute monitoring checks. RN will assess and educate as needed.

## 2017-02-07 NOTE — PLAN OF CARE
Problem: BH Patient Care Overview (Adult)  Goal: Plan of Care Review  Outcome: Ongoing (interventions implemented as appropriate)    02/07/17 0546 02/07/17 0919   Coping/Psychosocial Response Interventions   Plan Of Care Reviewed With --  patient   Coping/Psychosocial   Patient Agreement with Plan of Care --  agrees   Patient Care Overview   Progress --  no change   Outcome Evaluation   Outcome Summary/Follow up Plan Pt still have some anxiety, reports racing thoughts, withdrawn, isolate, little interactions with peers. --        Goal: Interdisciplinary Rounds/Family Conference  Outcome: Ongoing (interventions implemented as appropriate)    02/07/17 0919   Interdisciplinary Rounds/Family Conf   Participants advanced practice nurse;;nursing;physician;social work;other (see comments)       Goal: Individualization and Mutuality  Outcome: Ongoing (interventions implemented as appropriate)  Goal: Discharge Needs Assessment  Outcome: Ongoing (interventions implemented as appropriate)

## 2017-02-07 NOTE — PLAN OF CARE
Problem: BH Overarching Goals  Goal: Adheres to Safety Considerations for Self and Others  Outcome: Ongoing (interventions implemented as appropriate)  Goal: Optimized Coping Skills in Response to Life Stressors  Outcome: Ongoing (interventions implemented as appropriate)  Goal: Develops/Participates in Therapeutic [place] to Support Successful Transition  Outcome: Ongoing (interventions implemented as appropriate)

## 2017-02-07 NOTE — NURSING NOTE
"Behavior Pt is sitting on bed in room. When asked how her day has been pt states \"it was good, it was long.\" Pt denies depression but pt is quiet, keeps to self, and a little interactions with peers, and pt will isolate to room/self. Reports feeling tired and has no energy. Fair eye contact. Pt rates anxiety a 4/10, pt states \"my mind is racing and I'm having crazy thoughts like: what if we don't have a soul, what if we don't go no where when we die, what if we just exist.\" Pt said she is worried about her animals and if they are being take care of. Pt said she is anxious to get home but pt appears calm. Pt denies suicidal/homicidal ideations and hallucinations. Pt did attend group but did not speak during group.          Intervention  Instructed in med usage and effects, compliant with all night meds. Encouraged pt to continue to participate in treatment.  Encouraged pt to let staff know of any concerns or problems.        Response  Pt verbalized understanding           Plan  Will continue to monitor for safety/  every 15 minute monitoring checks and redirect PRN.    "

## 2017-02-07 NOTE — NURSING NOTE
Behavior   Anxiety 2  Depression 2  Pain 0  AVH no  S/I no  H/I no  Pt has been sitting in houser. Calm and cooperative. C/O pain off/on in feet says it is arthritis. Pt has been attending group.          Intervention  Instructed in med usage and effects, encouraged to verbalize needs. Meds administered as ordered. RN offered self for expression.           Response  Verbalized understanding. Pt took meds as prescribed.           Plan  Continue to monitor for safety/  every 15 minute monitoring checks.  RN will assess and educate as needed.

## 2017-02-07 NOTE — NURSING NOTE
Behavior Pt is resting in bed with eyes closed. No s/s of distress noted. Pt did get up once to check the time and wanted to take a shower, pt thought it was around 0500 but it was 0145. Pt was informed it was too early to take a shower and redirect back to bed. Pt has rested quietly most of the night.            Intervention          Response             Plan  Will continue to monitor for safety/  every 15 minute monitoring checks and redirect PRN.

## 2017-02-07 NOTE — PROGRESS NOTES
2/7/2017    Chief Complaint: suicide attempt and depression    Subjective:  Patient is a 51 y.o. female who was hospitalized for self injurious behavior and misuse of prescribed medications.   Since yesterday the patient report she is better and reports the Abilify is helping. Reports that she did sleep well last night. Appetite is good. Patient reports that level of hopefulness is 9/10.  Patient reports medications are effective.  Further history reported: The medications caused some drowsiness yesterday. Denies problems with Trazodone causing vivid dreams.  came by to visit. The visit went well.    Objective     Vital Signs    Temp:  [96.5 °F (35.8 °C)-97.3 °F (36.3 °C)] 96.5 °F (35.8 °C)  Heart Rate:  [63-73] 63  Resp:  [18] 18  BP: (110-142)/(62-64) 110/62    Physical Exam:   General Appearance: alert, appears stated age and cooperative,  Hygiene:   good  Gait & Station: Normal  Musculoskeletal: No tremors or abnormal involuntary movements}    Mental Status Exam:   Cooperation:  Cooperative  Eye Contact:  Good  Psychomotor Behavior:  Appropriate  Mood: Euthymic  Affect:  normal  Speech:  Normal  Thought Process:  Coherent  Associations: Goal Directed  Thought Content:     Normal   Suicidal:  None   Homicidal:  None   Hallucinations:  None   Delusion:  None  Cognitive Functioning:   Consciousness: awake and alert  Reliability:  fair  Insight:  Fair  Judgement:  Fair  Impulse Control:  Poor    Lab Results (last 24 hours)     Procedure Component Value Units Date/Time    TSH [[id number]]  (Abnormal) Collected:  02/07/17 0541    Specimen:  Blood Updated:  02/07/17 0746     TSH 6.220 (H) mIU/mL         Imaging Results (last 24 hours)     ** No results found for the last 24 hours. **          Medicine:   Current Facility-Administered Medications:   •  acetaminophen (TYLENOL) tablet 650 mg, 650 mg, Oral, Q4H PRN, Arabella Osullivan, APRN, 650 mg at 02/07/17 0623  •  aluminum-magnesium hydroxide-simethicone  (MAALOX/MYLANTA) suspension 30 mL, 30 mL, Oral, Q6H PRN, BRAYDON Gamez  •  [COMPLETED] ARIPiprazole (ABILIFY) tablet 2 mg, 2 mg, Oral, Daily, 2 mg at 02/06/17 1548 **FOLLOWED BY** ARIPiprazole (ABILIFY) tablet 5 mg, 5 mg, Oral, Daily, Lima Kaminski MD, 5 mg at 02/07/17 0852  •  FLUoxetine (PROzac) capsule 40 mg, 40 mg, Oral, Daily, Lima Kaminski MD, 40 mg at 02/07/17 0851  •  fluticasone (FLONASE) 50 MCG/ACT nasal spray 2 spray, 2 spray, Each Nare, Daily, Sharad Johnson MD, 2 spray at 02/07/17 0855  •  gabapentin (NEURONTIN) capsule 400 mg, 400 mg, Oral, TID, Lima Kaminski MD, 400 mg at 02/07/17 0852  •  hydrOXYzine (VISTARIL) capsule 50 mg, 50 mg, Oral, Q6H PRN, BRAYDON Gamez  •  ibuprofen (ADVIL,MOTRIN) tablet 600 mg, 600 mg, Oral, Q6H PRN, Lima Kaminski MD, 600 mg at 02/07/17 0943  •  lisinopril (PRINIVIL,ZESTRIL) tablet 10 mg, 10 mg, Oral, Q24H, Sharad Johnson MD, 10 mg at 02/07/17 0852  •  magnesium hydroxide (MILK OF MAGNESIA) suspension 2400 mg/10mL 10 mL, 10 mL, Oral, Daily PRN, Arabella Osullivan APRN  •  metoprolol succinate XL (TOPROL-XL) 24 hr tablet 25 mg, 25 mg, Oral, Q24H, Sharad Johnson MD, 25 mg at 02/07/17 0852  •  nicotine (NICODERM CQ) 21 MG/24HR patch 1 patch, 1 patch, Transdermal, Q24H, BRAYDON Gamez, 1 patch at 02/07/17 0852  •  pantoprazole (PROTONIX) EC tablet 40 mg, 40 mg, Oral, Q AM, Lima Kaminski MD, 40 mg at 02/07/17 0623  •  traZODone (DESYREL) tablet 50 mg, 50 mg, Oral, Nightly PRN, BRAYDON Gamez, 50 mg at 02/06/17 2016    Diagnoses/Assessment:   Active Problems:    Essential hypertension    Severe episode of recurrent major depressive disorder, without psychotic features    Social anxiety disorder    Suicidal overdose      Treatment Plan:    1) Will continue care for the patient on the behavioral health unit at [facility] to ensure patient safety.  2) Will continue to provide  treatment with the unit milieu, activities, therapies and psychopharmacological management.  3) Patient to be placed on or continued on  Q15 minute checks  and Suicide precautions.  4) Will continue medical management by Dr. Johnson.  5) Will order following labs: no new labs  6) Will make the following medication changes: Abilify was increased to 5 mg Q AM  7) Will continue discharge planning as appropriate for patient.  8) Psychotherapy provided: none   9) Discussed need to work on bereavement issues with his mother.    Treatment plan and medication risks and benefits discussed with: Patient    Arabella Osullivan, BRAYDON  02/07/17  11:24 AM
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
## 2017-02-08 PROCEDURE — 99232 SBSQ HOSP IP/OBS MODERATE 35: CPT | Performed by: PSYCHIATRY & NEUROLOGY

## 2017-02-08 RX ADMIN — GABAPENTIN 400 MG: 400 CAPSULE ORAL at 20:10

## 2017-02-08 RX ADMIN — ARIPIPRAZOLE 5 MG: 5 TABLET ORAL at 08:10

## 2017-02-08 RX ADMIN — FLUOXETINE 40 MG: 20 CAPSULE ORAL at 08:09

## 2017-02-08 RX ADMIN — PANTOPRAZOLE SODIUM 40 MG: 40 TABLET, DELAYED RELEASE ORAL at 06:32

## 2017-02-08 RX ADMIN — GABAPENTIN 400 MG: 400 CAPSULE ORAL at 15:34

## 2017-02-08 RX ADMIN — TRAZODONE HYDROCHLORIDE 50 MG: 50 TABLET ORAL at 20:29

## 2017-02-08 RX ADMIN — GABAPENTIN 400 MG: 400 CAPSULE ORAL at 08:09

## 2017-02-08 RX ADMIN — LISINOPRIL 10 MG: 10 TABLET ORAL at 08:09

## 2017-02-08 RX ADMIN — NICOTINE 1 PATCH: 21 PATCH TRANSDERMAL at 08:10

## 2017-02-08 RX ADMIN — METOPROLOL SUCCINATE 25 MG: 25 TABLET, EXTENDED RELEASE ORAL at 08:09

## 2017-02-08 RX ADMIN — IBUPROFEN 600 MG: 600 TABLET ORAL at 17:18

## 2017-02-08 RX ADMIN — IBUPROFEN 600 MG: 600 TABLET ORAL at 08:13

## 2017-02-08 RX ADMIN — FLUTICASONE PROPIONATE 2 SPRAY: 50 SPRAY, METERED NASAL at 08:10

## 2017-02-08 NOTE — PROGRESS NOTES
2/8/2017    Chief Complaint: suicide attempt    Subjective:  Patient is a 51 y.o. female who was hospitalized for suicide attempt.   Since yesterday the patient has been feeling much better.  She notes the abilify, prozac combination has been working well for her.  She notes that she wonders about going home.  She denies any suicidal thoughts.  She notes moving faster and notes gait is not sluggish any longer.  She is amenable to having a family session with her  tomorrow.  Patient reports that level of hopefulness is good.  Patient reports medications are tolerable and effective.  Objective     Vital Signs    Temp:  [97.2 °F (36.2 °C)-97.4 °F (36.3 °C)] 97.4 °F (36.3 °C)  Heart Rate:  [61-71] 71  Resp:  [18] 18  BP: (141-143)/(61-82) 141/61    Physical Exam:   General Appearance: alert, appears stated age and cooperative,  Hygiene:   good  Gait & Station: Normal  Musculoskeletal: No tremors or abnormal involuntary movements}    Mental Status Exam:   Cooperation:  Cooperative  Eye Contact:  Good  Psychomotor Behavior:  Appropriate  Mood: Euthymic  Affect:  normal  Speech:  Normal  Thought Process:  Coherent  Associations: Goal Directed  Thought Content:     Normal   Suicidal:  None   Homicidal:  None   Hallucinations:  None   Delusion:  None  Cognitive Functioning:   Consciousness: awake, alert and oriented  Reliability:  good  Insight:  Fair  Judgement:  Fair  Impulse Control:  Fair    Lab Results (last 24 hours)     ** No results found for the last 24 hours. **        Imaging Results (last 24 hours)     ** No results found for the last 24 hours. **          Medicine:   Current Facility-Administered Medications:   •  acetaminophen (TYLENOL) tablet 650 mg, 650 mg, Oral, Q4H PRN, BRAYDON Gamez, 650 mg at 02/07/17 0623  •  aluminum-magnesium hydroxide-simethicone (MAALOX/MYLANTA) suspension 30 mL, 30 mL, Oral, Q6H PRN, Arabella Osullivan, BRAYDON  •  [COMPLETED] ARIPiprazole (ABILIFY) tablet 2 mg, 2  mg, Oral, Daily, 2 mg at 02/06/17 1548 **FOLLOWED BY** ARIPiprazole (ABILIFY) tablet 5 mg, 5 mg, Oral, Daily, Lima Kaminski MD, 5 mg at 02/08/17 0810  •  FLUoxetine (PROzac) capsule 40 mg, 40 mg, Oral, Daily, Lima Kaminski MD, 40 mg at 02/08/17 0809  •  fluticasone (FLONASE) 50 MCG/ACT nasal spray 2 spray, 2 spray, Each Nare, Daily, Sharad Johnson MD, 2 spray at 02/08/17 0810  •  gabapentin (NEURONTIN) capsule 400 mg, 400 mg, Oral, TID, Lima Kaminski MD, 400 mg at 02/08/17 0809  •  hydrOXYzine (VISTARIL) capsule 50 mg, 50 mg, Oral, Q6H PRN, BRAYDON Gamez  •  ibuprofen (ADVIL,MOTRIN) tablet 600 mg, 600 mg, Oral, Q6H PRN, Lima Kaminski MD, 600 mg at 02/08/17 0813  •  lisinopril (PRINIVIL,ZESTRIL) tablet 10 mg, 10 mg, Oral, Q24H, Sharad Johnson MD, 10 mg at 02/08/17 0809  •  magnesium hydroxide (MILK OF MAGNESIA) suspension 2400 mg/10mL 10 mL, 10 mL, Oral, Daily PRN, BRAYDON Gamez  •  metoprolol succinate XL (TOPROL-XL) 24 hr tablet 25 mg, 25 mg, Oral, Q24H, Sharad Johnson MD, 25 mg at 02/08/17 0809  •  nicotine (NICODERM CQ) 21 MG/24HR patch 1 patch, 1 patch, Transdermal, Q24H, BRAYDON Gamez, 1 patch at 02/08/17 0810  •  pantoprazole (PROTONIX) EC tablet 40 mg, 40 mg, Oral, Q AM, Lima Kaminski MD, 40 mg at 02/08/17 0632  •  traZODone (DESYREL) tablet 50 mg, 50 mg, Oral, Nightly PRN, BRAYDON Gamez, 50 mg at 02/07/17 6126    Diagnoses/Assessment:   Active Problems:    Essential hypertension    Severe episode of recurrent major depressive disorder, without psychotic features    Social anxiety disorder    Suicidal overdose      Treatment Plan:    1) Will continue care for the patient on the behavioral health unit at [facility] to ensure patient safety.  2) Will continue to provide treatment with the unit milieu, activities, therapies and psychopharmacological management.  3) Patient to be placed on or continued on  Q15  minute checks  and Suicide precautions.  4) Will continue medical management by Dr. Johnson.  5) Will order following labs: none  6) Will make the following medication changes: cont the abilify and prozac  7) Will continue discharge planning as appropriate for patient.  8) Psychotherapy provided: none    Treatment plan and medication risks and benefits discussed with: Patient    Lima Kaminski MD  02/08/17  12:02 PM

## 2017-02-08 NOTE — NURSING NOTE
Behavior   Patient in hallway -requested ibuprofen.  Patient states that her bottom front teeth were hurting some-they are sensitive after she eats anything sweet. States her VAS is an 8.  Patient states that her anxiety level is 1 and her depression level is also a level of 1.  Patient has good eye contact.  States she is feeling much better.  Denies SI and HI.            Intervention  Instructed in med usage and effects, encouraged to verbalize needs. Meds administered as ordered.  Encouraged patient to let staff know of any concerns or questions.  PRN Ibuprofen given.          Response  Verbalized understanding           Plan  Continue to monitor for safety every 15 minute monitoring checks.

## 2017-02-08 NOTE — PLAN OF CARE
Problem: Alteration in Orientation  Goal: Treatment Goal: Demonstrate a reduction of confusion and improved orientation to person, place, time and/or situation upon discharge, according to optimum baseline/ability  Outcome: Ongoing (interventions implemented as appropriate)  Goal: Interact with staff daily  Outcome: Ongoing (interventions implemented as appropriate)  Goal: Express concerns related to confused thinking related to:  Outcome: Ongoing (interventions implemented as appropriate)  Goal: Allow medical examinations, as recommended  Outcome: Ongoing (interventions implemented as appropriate)  Goal: Cooperate with recommended testing/procedures  Outcome: Ongoing (interventions implemented as appropriate)  Goal: Attend and participate in unit activities, including therapeutic, recreational, and educational groups  Outcome: Ongoing (interventions implemented as appropriate)  Goal: Complete daily ADLs, including personal hygiene independently, as able  Outcome: Ongoing (interventions implemented as appropriate)    Problem: Risk for Self Injury/Neglect  Goal: Treatment Goal: Remain safe during length of stay, learn and adopt new coping skills, and be free of self-injurious ideation, impulses and acts at the time of discharge  Outcome: Ongoing (interventions implemented as appropriate)  Goal: Verbalize thoughts and feelings associated with:  Outcome: Ongoing (interventions implemented as appropriate)  Goal: Refrain from harming self  Outcome: Ongoing (interventions implemented as appropriate)  Goal: Attend and participate in unit activities, including therapeutic, recreational, and educational groups  Outcome: Ongoing (interventions implemented as appropriate)  Goal: Recognize maladaptive responses and adopt new coping mechanisms  Outcome: Ongoing (interventions implemented as appropriate)  Goal: Complete daily ADLs, including personal hygiene independently, as able  Outcome: Ongoing (interventions implemented as  appropriate)

## 2017-02-08 NOTE — NURSING NOTE
Behavior   Anxiety 5  Depression 2  Pain 5  AVH no  S/I no  H/I no  Good eye contact. Smiles with communication.Reveals she is tired of doing this and wants to go to AA. Relates the death of her sister & mother bother her a lot. Pt. Spoke of her  and that he treated her good.          Intervention  Instructed in med usage and effects, encouraged to verbalize needs. Monitoring every 15 minutes.Provided active listening & support.        Response  Verbalized understanding           Plan  Continue to monitor for safety/  every 15 minute monitoring checks.

## 2017-02-08 NOTE — PLAN OF CARE
Problem: BH Patient Care Overview (Adult)  Goal: Individualization and Mutuality  Outcome: Ongoing (interventions implemented as appropriate)  Goal: Discharge Needs Assessment  Outcome: Ongoing (interventions implemented as appropriate)    Problem:  Overarching Goals  Goal: Adheres to Safety Considerations for Self and Others  Outcome: Ongoing (interventions implemented as appropriate)  Goal: Optimized Coping Skills in Response to Life Stressors  Outcome: Ongoing (interventions implemented as appropriate)  Goal: Develops/Participates in Therapeutic [place] to Support Successful Transition  Outcome: Ongoing (interventions implemented as appropriate)    Problem: Risk for Self Injury/Neglect  Goal: Treatment Goal: Remain safe during length of stay, learn and adopt new coping skills, and be free of self-injurious ideation, impulses and acts at the time of discharge  Outcome: Ongoing (interventions implemented as appropriate)  Goal: Verbalize thoughts and feelings associated with:  Outcome: Ongoing (interventions implemented as appropriate)  Goal: Refrain from harming self  Outcome: Ongoing (interventions implemented as appropriate)  Goal: Attend and participate in unit activities, including therapeutic, recreational, and educational groups  Outcome: Ongoing (interventions implemented as appropriate)  Goal: Recognize maladaptive responses and adopt new coping mechanisms  Outcome: Ongoing (interventions implemented as appropriate)  Goal: Complete daily ADLs, including personal hygiene independently, as able  Outcome: Ongoing (interventions implemented as appropriate)

## 2017-02-08 NOTE — NURSING NOTE
Behavior   Anxiety -1  Depression-1  Pain -1  AVH-None  S/I -Denies  H/I -Denies  Patient states that when she was at her home, she got to thinking about the things in her life and her mom being gone and it was overwhelming.  She said that she started taking her pills one at a time and before she knew it she had taken all of her meds she had.  Her  called EMS and that's how she ended up here.            Intervention  Instructed in med usage and effects, encouraged to verbalize needs. Meds administered as ordered.  Encouraged patient to let staff know if she has any questions or concerns.          Response  Verbalized understanding           Plan  Continue to monitor for safety  every 15 minute monitoring checks.

## 2017-02-09 VITALS
TEMPERATURE: 96.9 F | SYSTOLIC BLOOD PRESSURE: 142 MMHG | BODY MASS INDEX: 29.47 KG/M2 | HEART RATE: 63 BPM | DIASTOLIC BLOOD PRESSURE: 72 MMHG | WEIGHT: 183.4 LBS | RESPIRATION RATE: 18 BRPM | OXYGEN SATURATION: 96 % | HEIGHT: 66 IN

## 2017-02-09 PROCEDURE — 99238 HOSP IP/OBS DSCHRG MGMT 30/<: CPT | Performed by: PSYCHIATRY & NEUROLOGY

## 2017-02-09 RX ORDER — CLONIDINE HYDROCHLORIDE 0.1 MG/1
0.1 TABLET ORAL EVERY 4 HOURS PRN
Status: DISCONTINUED | OUTPATIENT
Start: 2017-02-09 | End: 2017-02-09 | Stop reason: HOSPADM

## 2017-02-09 RX ORDER — ARIPIPRAZOLE 5 MG/1
5 TABLET ORAL DAILY
Qty: 30 TABLET | Refills: 0 | Status: ON HOLD | OUTPATIENT
Start: 2017-02-09

## 2017-02-09 RX ORDER — FLUOXETINE HYDROCHLORIDE 40 MG/1
40 CAPSULE ORAL DAILY
Qty: 30 CAPSULE | Refills: 0 | Status: SHIPPED | OUTPATIENT
Start: 2017-02-09 | End: 2020-01-08

## 2017-02-09 RX ORDER — GABAPENTIN 400 MG/1
400 CAPSULE ORAL 3 TIMES DAILY
Qty: 90 CAPSULE | Refills: 0 | Status: ON HOLD | OUTPATIENT
Start: 2017-02-09 | End: 2023-03-23

## 2017-02-09 RX ADMIN — PANTOPRAZOLE SODIUM 40 MG: 40 TABLET, DELAYED RELEASE ORAL at 06:46

## 2017-02-09 RX ADMIN — NICOTINE 1 PATCH: 21 PATCH TRANSDERMAL at 08:03

## 2017-02-09 RX ADMIN — FLUTICASONE PROPIONATE 2 SPRAY: 50 SPRAY, METERED NASAL at 08:04

## 2017-02-09 RX ADMIN — METOPROLOL SUCCINATE 25 MG: 25 TABLET, EXTENDED RELEASE ORAL at 08:04

## 2017-02-09 RX ADMIN — ARIPIPRAZOLE 5 MG: 5 TABLET ORAL at 08:04

## 2017-02-09 RX ADMIN — FLUOXETINE 40 MG: 20 CAPSULE ORAL at 08:04

## 2017-02-09 RX ADMIN — GABAPENTIN 400 MG: 400 CAPSULE ORAL at 08:04

## 2017-02-09 RX ADMIN — LISINOPRIL 10 MG: 10 TABLET ORAL at 08:04

## 2017-02-09 NOTE — PLAN OF CARE
Problem: Risk for Self Injury/Neglect  Goal: Refrain from harming self  Outcome: Ongoing (interventions implemented as appropriate)

## 2017-02-09 NOTE — DISCHARGE SUMMARY
"Admission Date: 2017    Discharge Date: 2017    Psychiatric History: Patient is a 51 y.o. female who presents with suicide attempt by overdose on Soma. Patient was hosp on medical floor prior to hosp on psych. She thinks she was trying to get high not commit suicide. Onset of symptoms was gradual starting a few months ago. Symptoms have been present on a constant basis. Symptoms are associated with anxiety, insomnia, depressed mood and substance use. Symptoms are aggravated by problems with substance abuse and grieving mom after her death about 1 year ago.  Symptoms improve with \"xanax\" which she notes she over takes.  Patients symptoms severity is moderate. Patient reports that level of hopefulness is 5/10. Patient's symptoms occur in the context of grief about her mom and her use of sedative agents to numb herself. She notes worsening of her over use of xanax and soma since her death. She has been on xanax and soma for the last 10 yrs. She notes overuse was present before mom  but worse afterward. Patient notes cycling of mood but they are b/w normal and depressed not manic and depressed.    Diagnostic Data:    Recent Results (from the past 168 hour(s))   Comprehensive Metabolic Panel    Collection Time: 17  5:00 PM   Result Value Ref Range    Glucose 110 (H) 60 - 100 mg/dL    BUN 9 7 - 21 mg/dL    Creatinine 0.88 0.50 - 1.00 mg/dL    Sodium 134 (L) 137 - 145 mmol/L    Potassium 3.5 3.5 - 5.1 mmol/L    Chloride 98 95 - 110 mmol/L    CO2 23.0 22.0 - 31.0 mmol/L    Calcium 9.0 8.4 - 10.2 mg/dL    Total Protein 7.2 6.3 - 8.6 g/dL    Albumin 4.20 3.40 - 4.80 g/dL    ALT (SGPT) 27 9 - 52 U/L    AST (SGOT) 28 14 - 36 U/L    Alkaline Phosphatase 85 38 - 126 U/L    Total Bilirubin 0.5 0.2 - 1.3 mg/dL    eGFR Non  Amer 68 51 - 120 mL/min/1.73    Globulin 3.0 2.3 - 3.5 gm/dL    A/G Ratio 1.4 1.1 - 1.8 g/dL    BUN/Creatinine Ratio 10.2 7.0 - 25.0    Anion Gap 13.0 5.0 - 15.0 mmol/L   Acetaminophen " Level    Collection Time: 02/04/17  5:00 PM   Result Value Ref Range    Acetaminophen <10.0 (L) 10.0 - 30.0 mcg/mL   Ethanol    Collection Time: 02/04/17  5:00 PM   Result Value Ref Range    Ethanol <10 (H) 0 - 0 mg/dL    Ethanol % <0.010 %   Salicylate Level    Collection Time: 02/04/17  5:00 PM   Result Value Ref Range    Salicylate <1.0 (L) 10.0 - 20.0 mg/dL   Light Blue Top    Collection Time: 02/04/17  5:00 PM   Result Value Ref Range    Extra Tube hold for add-on    Green Top (Gel)    Collection Time: 02/04/17  5:00 PM   Result Value Ref Range    Extra Tube Hold for add-ons.    Lavender Top    Collection Time: 02/04/17  5:00 PM   Result Value Ref Range    Extra Tube hold for add-on    Gold Top - SST    Collection Time: 02/04/17  5:00 PM   Result Value Ref Range    Extra Tube Hold for add-ons.    CBC Auto Differential    Collection Time: 02/04/17  5:00 PM   Result Value Ref Range    WBC 10.84 (H) 3.20 - 9.80 10*3/mm3    RBC 4.24 3.77 - 5.16 10*6/mm3    Hemoglobin 13.2 12.0 - 15.5 g/dL    Hematocrit 36.8 35.0 - 45.0 %    MCV 86.8 80.0 - 98.0 fL    MCH 31.1 26.5 - 34.0 pg    MCHC 35.9 31.4 - 36.0 g/dL    RDW 13.7 11.5 - 14.5 %    RDW-SD 43.5 36.4 - 46.3 fl    MPV 11.4 8.0 - 12.0 fL    Platelets 224 150 - 450 10*3/mm3    Neutrophil % 66.1 37.0 - 80.0 %    Lymphocyte % 24.4 10.0 - 50.0 %    Monocyte % 6.2 0.0 - 12.0 %    Eosinophil % 2.2 0.0 - 7.0 %    Basophil % 0.7 0.0 - 2.0 %    Immature Grans % 0.4 0.0 - 0.5 %    Neutrophils, Absolute 7.17 2.00 - 8.60 10*3/mm3    Lymphocytes, Absolute 2.64 0.60 - 4.20 10*3/mm3    Monocytes, Absolute 0.67 0.00 - 0.90 10*3/mm3    Eosinophils, Absolute 0.24 0.00 - 0.70 10*3/mm3    Basophils, Absolute 0.08 0.00 - 0.20 10*3/mm3    Immature Grans, Absolute 0.04 (H) 0.00 - 0.02 10*3/mm3   Urine Drug Screen    Collection Time: 02/04/17  5:16 PM   Result Value Ref Range    Amphetamine Screen, Urine Negative Negative    Barbiturates Screen, Urine Negative Negative    Benzodiazepine  Screen, Urine Positive (A) Negative    Cocaine Screen, Urine Negative Negative    Methadone Screen, Urine Negative Negative    Opiate Screen Negative Negative    Oxycodone Screen, Urine Negative Negative    THC, Screen, Urine Negative Negative   Urinalysis With / Culture If Indicated    Collection Time: 02/04/17  5:16 PM   Result Value Ref Range    Color, UA Dark Yellow Yellow, Straw, Dark Yellow, Zuri    Appearance, UA Cloudy (A) Clear    pH, UA 7.0 5.0 - 9.0    Specific Gravity, UA 1.029 1.003 - 1.030    Glucose, UA Negative Negative    Ketones, UA Trace (A) Negative    Bilirubin, UA Small (1+) (A) Negative    Blood, UA Negative Negative    Protein, UA Trace (A) Negative    Leuk Esterase, UA Moderate (2+) (A) Negative    Nitrite, UA Positive (A) Negative    Urobilinogen, UA 1.0 E.U./dL 0.2 - 1.0 E.U./dL   Urinalysis, Microscopic Only    Collection Time: 02/04/17  5:16 PM   Result Value Ref Range    RBC, UA None Seen None Seen /HPF    WBC, UA 3-5 None Seen, 0-2, 3-5 /HPF    Bacteria, UA 2+ (A) None Seen /HPF    Squamous Epithelial Cells, UA None Seen None Seen, 0-2 /HPF    Hyaline Casts, UA None Seen None Seen /LPF    Methodology Manual Light Microscopy    Urine Culture    Collection Time: 02/04/17  5:16 PM   Result Value Ref Range    Urine Culture >100,000 CFU/mL Escherichia coli (A)     Beta Lactamase Not Performed        Susceptibility    Escherichia coli - VARUN     Amikacin <=16 Susceptible ug/ml     Ampicillin <=8 Susceptible ug/ml     Ampicillin + Sulbactam <=8/4 Susceptible ug/ml     Cefazolin <=8 Susceptible ug/ml     Ceftazidime <=1 Susceptible ug/ml     Ceftriaxone <=8 Susceptible ug/ml     Cefuroxime <=4 Susceptible ug/ml     Ciprofloxacin <=1 Susceptible ug/ml     Gentamicin <=4 Susceptible ug/ml     Levofloxacin <=2 Susceptible ug/ml     Nitrofurantoin <=32 Susceptible ug/ml     Piperacillin + Tazobactam <=16 Susceptible ug/ml     Tobramycin <=4 Susceptible ug/ml     Trimethoprim + Sulfamethoxazole  <=2/38 Susceptible ug/ml   Basic Metabolic Panel    Collection Time: 02/05/17  6:09 AM   Result Value Ref Range    Glucose 69 60 - 100 mg/dL    BUN 6 (L) 7 - 21 mg/dL    Creatinine 0.60 0.50 - 1.00 mg/dL    Sodium 137 137 - 145 mmol/L    Potassium 2.9 (L) 3.5 - 5.1 mmol/L    Chloride 112 (H) 95 - 110 mmol/L    CO2 19.0 (L) 22.0 - 31.0 mmol/L    Calcium 6.2 (L) 8.4 - 10.2 mg/dL    eGFR Non African Amer 105 >60 mL/min/1.73    BUN/Creatinine Ratio 10.0 7.0 - 25.0    Anion Gap 6.0 5.0 - 15.0 mmol/L   CBC Auto Differential    Collection Time: 02/05/17  6:09 AM   Result Value Ref Range    WBC 8.00 3.20 - 9.80 10*3/mm3    RBC 3.45 (L) 3.77 - 5.16 10*6/mm3    Hemoglobin 10.5 (L) 12.0 - 15.5 g/dL    Hematocrit 29.8 (L) 35.0 - 45.0 %    MCV 86.4 80.0 - 98.0 fL    MCH 30.4 26.5 - 34.0 pg    MCHC 35.2 31.4 - 36.0 g/dL    RDW 13.7 11.5 - 14.5 %    RDW-SD 43.5 36.4 - 46.3 fl    MPV 11.7 8.0 - 12.0 fL    Platelets 187 150 - 450 10*3/mm3    Neutrophil % 73.1 37.0 - 80.0 %    Lymphocyte % 18.9 10.0 - 50.0 %    Monocyte % 4.9 0.0 - 12.0 %    Eosinophil % 2.3 0.0 - 7.0 %    Basophil % 0.5 0.0 - 2.0 %    Immature Grans % 0.3 0.0 - 0.5 %    Neutrophils, Absolute 5.86 2.00 - 8.60 10*3/mm3    Lymphocytes, Absolute 1.51 0.60 - 4.20 10*3/mm3    Monocytes, Absolute 0.39 0.00 - 0.90 10*3/mm3    Eosinophils, Absolute 0.18 0.00 - 0.70 10*3/mm3    Basophils, Absolute 0.04 0.00 - 0.20 10*3/mm3    Immature Grans, Absolute 0.02 0.00 - 0.02 10*3/mm3    nRBC 0.3 (H) 0.0 - 0.0 /100 WBC   Scan Slide    Collection Time: 02/05/17  6:09 AM   Result Value Ref Range    RBC Morphology Normal Normal    WBC Morphology Normal Normal    Platelet Morphology Normal Normal   Basic Metabolic Panel    Collection Time: 02/06/17  7:20 AM   Result Value Ref Range    Glucose 142 (H) 60 - 100 mg/dL    BUN 8 7 - 21 mg/dL    Creatinine 0.75 0.50 - 1.00 mg/dL    Sodium 141 137 - 145 mmol/L    Potassium 3.9 3.5 - 5.1 mmol/L    Chloride 108 95 - 110 mmol/L    CO2 22.0 22.0 -  31.0 mmol/L    Calcium 8.9 8.4 - 10.2 mg/dL    eGFR Non  Amer 81 51 - 120 mL/min/1.73    BUN/Creatinine Ratio 10.7 7.0 - 25.0    Anion Gap 11.0 5.0 - 15.0 mmol/L   CBC Auto Differential    Collection Time: 02/06/17  7:20 AM   Result Value Ref Range    WBC 6.04 3.20 - 9.80 10*3/mm3    RBC 4.34 3.77 - 5.16 10*6/mm3    Hemoglobin 13.4 12.0 - 15.5 g/dL    Hematocrit 37.3 35.0 - 45.0 %    MCV 85.9 80.0 - 98.0 fL    MCH 30.9 26.5 - 34.0 pg    MCHC 35.9 31.4 - 36.0 g/dL    RDW 14.0 11.5 - 14.5 %    RDW-SD 44.2 36.4 - 46.3 fl    MPV 11.5 8.0 - 12.0 fL    Platelets 208 150 - 450 10*3/mm3    Neutrophil % 61.8 37.0 - 80.0 %    Lymphocyte % 27.6 10.0 - 50.0 %    Monocyte % 5.6 0.0 - 12.0 %    Eosinophil % 3.6 0.0 - 7.0 %    Basophil % 1.2 0.0 - 2.0 %    Immature Grans % 0.2 0.0 - 0.5 %    Neutrophils, Absolute 3.73 2.00 - 8.60 10*3/mm3    Lymphocytes, Absolute 1.67 0.60 - 4.20 10*3/mm3    Monocytes, Absolute 0.34 0.00 - 0.90 10*3/mm3    Eosinophils, Absolute 0.22 0.00 - 0.70 10*3/mm3    Basophils, Absolute 0.07 0.00 - 0.20 10*3/mm3    Immature Grans, Absolute 0.01 0.00 - 0.02 10*3/mm3    nRBC 0.0 0.0 - 0.0 /100 WBC   TSH    Collection Time: 02/07/17  5:41 AM   Result Value Ref Range    TSH 6.220 (H) 0.460 - 4.680 mIU/mL       Summary of Hospital Course:  Patient was admitted to the behavioral health unit at [facility] to ensure patient safety.  Patient was provided treatment with the unit milieu, activities, therapies and psychopharmacological management.  Patient was placed on Q15 minute checks and Suicide.  Dr. Johnson was consulted for management of medical co-morbidities.  Patient was restarted on the following psychiatric medications: Prozac 40mg daily and neurontin decreased to 400mg tid.  The following medication changes were made during the hospital stay: Abilify was started and increased to 5mg daily.  Patient had improvement over the course of the hospital stay and tolerated his medications.   Patient had family session with her  that went well.  He reported that he would monitor her medications to reduce risk of OD.  Substance abuse issues were not present.    Patients Condition at Discharge:  Patient is stable for discharge and is not an imminent threat to self or others.  The patient's behavrior was Appropriate.  Patient reported that mood was Euthymic.  Patient's affect was normal.  Patient's thought content was as follows:   Suicidal:  None   Homicidal:  None   Hallucinations:  None   Delusion:  None    Discharge Diagnosis:  Active Problems:    Essential hypertension    Severe episode of recurrent major depressive disorder, without psychotic features    Social anxiety disorder    Suicidal overdose      Discharge Medications:      Your medication list      START taking these medications       Instructions Last Dose Given Next Dose Due    ARIPiprazole 5 MG tablet   Commonly known as:  ABILIFY        Take 1 tablet by mouth Daily.         gabapentin 400 MG capsule   Commonly known as:  NEURONTIN   Replaces:  gabapentin 600 MG tablet        Take 1 capsule by mouth 3 (Three) Times a Day.           CHANGE how you take these medications       Instructions Last Dose Given Next Dose Due    FLUoxetine 40 MG capsule   Commonly known as:  PROzac   What changed:    - how much to take  - how to take this  - when to take this        Take 1 capsule by mouth Daily.           CONTINUE taking these medications       Instructions Last Dose Given Next Dose Due    ibuprofen 600 MG tablet   Commonly known as:  ADVIL,MOTRIN              lisinopril 10 MG tablet   Commonly known as:  PRINIVIL,ZESTRIL              metoprolol succinate XL 25 MG 24 hr tablet   Commonly known as:  TOPROL-XL              NASONEX 50 MCG/ACT nasal spray   Generic drug:  mometasone              omeprazole 40 MG capsule   Commonly known as:  priLOSEC              promethazine 25 MG tablet   Commonly known as:  PHENERGAN                STOP  taking these medications          gabapentin 600 MG tablet   Commonly known as:  NEURONTIN   Replaced by:  gabapentin 400 MG capsule           nicotine 14 MG/24HR patch   Commonly known as:  NICODERM CQ           SOMA 350 MG tablet   Generic drug:  carisoprodol           XANAX 0.5 MG tablet   Generic drug:  ALPRAZolam                Where to Get Your Medications      These medications were sent to [facility], [facility] - [place], [place] - [address] - [phone]  - [phone]   [address] [address]     Phone:  [phone]    • ARIPiprazole 5 MG tablet         You can get these medications from any pharmacy     Bring a paper prescription for each of these medications    • FLUoxetine 40 MG capsule   • gabapentin 400 MG capsule             Justification for multiple antipsychotic medications at discharge:  Not Applicable.    Disposition: Patient was discharged home with family.    Follow-up Information     Follow up with [facility]. Go in 1 week(s).    Why:  [facility] to [facility] within 1 week of discharge.  Hours: Monday-Friday from 8:30am-4pm    Take ID, Ins Card, SS Card to follow up    Call Crisis Hotline as needed at [phone]    Contact information:    [address], [place]  [phone]        Follow up with [facility]. Go on 2/23/2017.    Why:  Arrive at 11:30 for medication appt    Contact information:    [address], [place]  [phone]          Psychiatric follow up will be with [facility].  Medical follow up will be with primary care physician.    Time Spent: Less than 30 minutes.    Lima Kaminski MD  02/09/17  1:24 PM

## 2017-02-09 NOTE — SIGNIFICANT NOTE
"   02/09/17 1203   Family Counseling   Time Session Began 1100   Time Session Ended 1130   Total Time (minutes) 30   Participants spouse   Topic Safety/Dc Plan   Session Detail CSW met with pt and spouse and reviewed safety/dc plan. CSW allowed spouse to voice any concerns. CSW scheduled follow up appt with therapist and MD at [facility]. CSW educated pt and spouse on Crisis Hotline, advised them to call as needed.   Patient Response Pt presented with fair mood, affect was neutral. Pt reports that she \"feels better. The abilify is working.\" per spouse, he \"can tell she feels better.\" Spouse and pt discussed signs and symptoms to be aware of to prevent readmission to the hospital. Pt has fair insight and judgement. Pt was able to identify her stressors/triggers, and also verbalized her plan to use appropriate coping skills. Spouse was supportive and offered support throughout session. Plan is for pt to return home and transition to outpt tx. Pt has participated in individual and group tx, has been med compliant. BPRS was complete and pt was given Press Ganey to complete and return at dc.     "

## 2017-02-09 NOTE — PLAN OF CARE
Problem: BH Patient Care Overview (Adult)  Goal: Plan of Care Review  Outcome: Ongoing (interventions implemented as appropriate)

## 2017-02-09 NOTE — NURSING NOTE
Behavior   Anxiety 0  Depression 0  Pain 0  AVH denies  S/I  denies  H/I  denies            Intervention  Instructed in med usage and effects, encouraged to verbalize needs. Meds administered as ordered.          Response  Verbalized understanding           Plan  Continue to monitor for safety every 15 minute monitoring checks.

## 2017-02-09 NOTE — NURSING NOTE
Behavior   Anxiety 5  Depression 5  Pain 0  AVH no  S/I no  H/I no     PT IS SLEEPING. EARLIER SHE STATED SHE WAS TIRED AND JUST WANTED TO SLEEP. ADMINISTERED MEDS AS ORDERED.            Intervention  Instructed in med usage and effects, encouraged to verbalize needs. Meds administered as ordered.          Response  Verbalized understanding           Plan  Continue to monitor for safety/  every 15 minute monitoring checks.

## 2017-02-09 NOTE — PLAN OF CARE
Problem: BH Overarching Goals  Goal: Adheres to Safety Considerations for Self and Others  Outcome: Ongoing (interventions implemented as appropriate)  Goal: Optimized Coping Skills in Response to Life Stressors  Outcome: Ongoing (interventions implemented as appropriate)  Goal: Develops/Participates in Therapeutic [place] to Support Successful Transition  Outcome: Ongoing (interventions implemented as appropriate)    Problem: Risk for Self Injury/Neglect  Goal: Treatment Goal: Remain safe during length of stay, learn and adopt new coping skills, and be free of self-injurious ideation, impulses and acts at the time of discharge  Outcome: Ongoing (interventions implemented as appropriate)  Goal: Verbalize thoughts and feelings associated with:  Outcome: Ongoing (interventions implemented as appropriate)  Goal: Refrain from harming self  Outcome: Ongoing (interventions implemented as appropriate)  Goal: Attend and participate in unit activities, including therapeutic, recreational, and educational groups  Outcome: Ongoing (interventions implemented as appropriate)  Goal: Recognize maladaptive responses and adopt new coping mechanisms  Outcome: Ongoing (interventions implemented as appropriate)  Goal: Complete daily ADLs, including personal hygiene independently, as able  Outcome: Ongoing (interventions implemented as appropriate)

## 2017-02-09 NOTE — PLAN OF CARE
Problem: Risk for Self Injury/Neglect  Goal: Treatment Goal: Remain safe during length of stay, learn and adopt new coping skills, and be free of self-injurious ideation, impulses and acts at the time of discharge  Outcome: Ongoing (interventions implemented as appropriate)

## 2017-02-09 NOTE — PLAN OF CARE
Problem: Risk for Self Injury/Neglect  Goal: Verbalize thoughts and feelings associated with:  Outcome: Ongoing (interventions implemented as appropriate)

## 2017-02-09 NOTE — NURSING NOTE
Patient ambulated from [facility] for d/c home with .  Patient stable with no s/sx of distress.  All d/c paperwork, prescriptions and follow up appointments discussed with patient.  Patient verbalized understanding.  All paperwork signed.  Two prescriptions and personal belongings returned to patient.  Third prescription for Ability was electronically sent to her pharmacy ([facility] in [place]) which patient stated was fine and that it where she fills her medications.

## 2019-02-25 ENCOUNTER — TRANSCRIBE ORDERS (OUTPATIENT)
Dept: PHYSICAL THERAPY | Facility: HOSPITAL | Age: 54
End: 2019-02-25

## 2019-02-25 DIAGNOSIS — M54.5 LOW BACK PAIN, UNSPECIFIED BACK PAIN LATERALITY, UNSPECIFIED CHRONICITY, WITH SCIATICA PRESENCE UNSPECIFIED: Primary | ICD-10-CM

## 2019-02-27 ENCOUNTER — HOSPITAL ENCOUNTER (OUTPATIENT)
Dept: PHYSICAL THERAPY | Facility: HOSPITAL | Age: 54
Setting detail: THERAPIES SERIES
Discharge: HOME | End: 2019-02-27

## 2019-02-27 DIAGNOSIS — M54.50 PAIN, LUMBAR REGION: Primary | ICD-10-CM

## 2019-02-27 PROCEDURE — G0283 ELEC STIM OTHER THAN WOUND: HCPCS

## 2019-02-27 PROCEDURE — 97535 SELF CARE MNGMENT TRAINING: CPT

## 2019-02-27 PROCEDURE — 97161 PT EVAL LOW COMPLEX 20 MIN: CPT

## 2019-02-27 NOTE — THERAPY EVALUATION
Outpatient Physical Therapy Ortho Initial Evaluation  [facility]     Patient Name: Lisa Gardner  : 1965  MRN: [id number]  Today's Date: 2019      Visit Date: 2019    Patient Active Problem List   Diagnosis   • Essential hypertension   • Precordial pain   • Severe episode of recurrent major depressive disorder, without psychotic features (CMS/HCC)   • Social anxiety disorder   • Suicidal overdose (CMS/HCC)        Past Medical History:   Diagnosis Date   • Alcohol abuse    • Anxiety    • Depression    • Hypertension    • Menopause    • Substance abuse    • Suicide attempt         Past Surgical History:   Procedure Laterality Date   • BLADDER SURGERY      bladder stretched as a child   • TONSILLECTOMY     • TUBAL ABDOMINAL LIGATION         Visit Dx:     ICD-10-CM ICD-9-CM   1. Pain, lumbar region M54.5 724.2       Patient History     Row Name 19 0900             History    Chief Complaint  Pain  -DH      Type of Pain  Back pain;Hip pain  -DH      Date Current Problem(s) Began  -- Progressively worse over last 2 years  -      Brief Description of Current Complaint  Pain mid back, crosses to left hip and in hip joint, hurts to touch it  -      Previous treatment for THIS PROBLEM  Medication  -      Patient/Caregiver Goals  -- on and off over the years  -      Smoking Status  pack a day  -      Patient's Rating of General Health  Very good  -      Hand Dominance  right-handed  -DH         Pain     Pain Location  Back;Buttocks;Hip  -DH      Pain at Present  8 took a pain pill this morning  -      Pain at Best  6  -DH      Pain at Worst  10  -DH      Pain Frequency  Constant/continuous  -      Pain Description  Aching;Burning;Dull;Radiating;Sharp  -      What Performance Factors Make the Current Problem(s) WORSE?  Housekeeping  -      What Performance Factors Make the Current Problem(s) BETTER?  stretching  -      Pain Comments  I can bend forward, can't  bend back. Bending back makes it worse.  -      Is medication used to assist with sleep?  Yes  -      Total hours of sleep per night  3 to 6 hrs  -         Fall Risk Assessment    Any falls in the past year:  No  -         Safety    Are you being hurt, hit, or frightened by anyone at home or in your life?  No  -DH      Are you being neglected by a caregiver  No  -        User Key  (r) = Recorded By, (t) = Taken By, (c) = Cosigned By    Initials Name Provider Type     Debbie Trujillo PT Physical Therapist          PT Ortho     Row Name 02/27/19 0900       Subjective Pain    Able to rate subjective pain?  yes  -    Pre-Treatment Pain Level  8  -       Posture/Observations    Posture/Observations Comments  Significant soft tissue tightness entire back with hypomobillity.  The normal lumbar lordosis is increased, the head is forward. There appears to be a leg length discrepancy with LLE shorter than RLE. Unknown if this is structural or functional discrepancy. The L ischium is forward.  -       General ROM    GENERAL ROM COMMENTS  Trunk flexion is WNL. Trunk extension, B side-bending , and trunk rotation all limited and painful.  -       MMT (Manual Muscle Testing)    General MMT Comments  R LE MMT grossly 4/5, L MMT grossly 3+/5  -      User Key  (r) = Recorded By, (t) = Taken By, (c) = Cosigned By    Initials Name Provider Type     Debbie Trujillo PT Physical Therapist                      Therapy Education  Education Details: Patient received instruction in HEP for beginning flexibility and gentle strengthening.  Given: HEP, Symptoms/condition management, Pain management, Mobility training  Program: New  How Provided: Verbal, Demonstration, Written  Provided to: Patient  Level of Understanding: Verbalized, Demonstrated  88635 - PT Self Care/Mgmt Minutes: 15     PT OP Goals     Row Name 02/27/19 0900          PT Short Term Goals    STG Date to Achieve  03/13/19  -     STG 1  Patient will  report lower back pain no greater than 5/10  -     STG 1 Progress  New  -     STG 2  Patient will demonstrate independence with HEP  -     STG 2 Progress  New  -     STG 3  Patient will demonstrate good postural alignment  -     STG 3 Progress  New  -        Long Term Goals    LTG Date to Achieve  04/25/19  -     LTG 1  Patient will report pain no greater than 3/10  -     LTG 1 Progress  New  -     LTG 2  Patient will demonstrate independence with home program for pain management.  -     LTG 2 Progress  New  -     LTG 3  Patient will be independent with HEP.  -     LTG 3 Progress  New  -        Time Calculation    PT Goal Re-Cert Due Date  03/20/19  -       User Key  (r) = Recorded By, (t) = Taken By, (c) = Cosigned By    Initials Name Provider Type     Debbie Trujillo, PT Physical Therapist          PT Assessment/Plan     Row Name 02/27/19 0900          PT Assessment    Functional Limitations  Decreased safety during functional activities;Limitation in home management;Limitations in functional capacity and performance;Performance in leisure activities  -     Impairments  Endurance;Gait;Impaired postural alignment;Muscle strength;Pain;Poor body mechanics;Posture;Range of motion  -     Assessment Comments  Patient presents with reports of 8/10 back pain that goes into the L hip, postural malalignment, limitation of trunk mobility. and generalized weakness. Patient may benefit from physical therapy for pain management, flexibility and strengthening in oreder to assist in restoring functional mobility and safety.   -     Please refer to paper survey for additional self-reported information  Yes  -DH     Rehab Potential  Fair  -     Patient/caregiver participated in establishment of treatment plan and goals  Yes  -     Patient would benefit from skilled therapy intervention  Yes  -DH        PT Plan    PT Frequency  2x/week  -     Predicted Duration of Therapy Intervention  (Therapy Eval)  6 to 8 weeks  -     Planned CPT's?  PT EVAL LOW COMPLEXITY: 61802;PT RE-EVAL: 91678;PT THER PROC EA 15 MIN: 57372;PT THER ACT EA 15 MIN: 89484;PT MANUAL THERAPY EA 15 MIN: 01825;PT NEUROMUSC RE-EDUCATION EA 15 MIN: 95463;PT GAIT TRAINING EA 15 MIN: 83454;PT HOT OR COLD PACK TREAT MCARE;PT ELECTRICAL STIM UNATTEND: ;PT ULTRASOUND EA 15 MIN: 93578  -     Physical Therapy Interventions (Optional Details)  balance training;home exercise program;lumbar stabilization;manual therapy techniques;modalities;neuromuscular re-education;patient/family education;postural re-education;strengthening;stretching  -       User Key  (r) = Recorded By, (t) = Taken By, (c) = Cosigned By    Initials Name Provider Type     Debbie Trujillo, EARLE Physical Therapist          Modalities     Row Name 02/27/19 0900             Moist Heat    MH Applied  Yes  -      Location  Applied to back in conjunction with Estim  -DH      Rx Minutes  15 mins  -      MH S/P Rx  Yes  -         ELECTRICAL STIMULATION    Attended/Unattended  Unattended  -      Stimulation Type  IFC  -      Location/Electrode Placement/Other  quadrapolar placement lower back  -       PT E-Stim Unattended (Manual) Minutes  15  -DH        User Key  (r) = Recorded By, (t) = Taken By, (c) = Cosigned By    Initials Name Provider Type     Debbie Trujillo, EARLE Physical Therapist        Exercises     Row Name 02/27/19 0900             Subjective Pain    Able to rate subjective pain?  yes  -      Pre-Treatment Pain Level  8  -         Exercise 1    Exercise Name 1  TRANS ABS  -DH      Cueing 1  Verbal;Tactile;Demo  -DH      Additional Comments  Added to HEP  -         Exercise 2    Exercise Name 2  CORE STRETCHES  -      Cueing 2  Verbal;Tactile;Demo  -DH      Additional Comments  Addd to HEP  -        User Key  (r) = Recorded By, (t) = Taken By, (c) = Cosigned By    Initials Name Provider Type     Debbie Trujillo, EARLE Physical Therapist                         Outcome Measure Options: Modifed Owestry  Modified Oswestry  Modified Oswestry Score/Comments: 28.57      Time Calculation:     Start Time: 0900  Stop Time: 1000  Time Calculation (min): 60 min     Therapy Charges for Today     Code Description Service Date Service Provider Modifiers Qty    85542417607 HC PT SELF CARE/MGMT/TRAIN EA 15 MIN 2/27/2019 Debbie Trujillo, PT GP 1    50319851835 HC PT ELECTRICAL STIM UNATTENDED 2/27/2019 Debbie Trujillo, PT  1    20821863014 HC PT EVAL LOW COMPLEXITY 2 2/27/2019 Debbie Trujillo, PT GP 1          PT G-Codes  Outcome Measure Options: Modifed Owestry  Modified Oswestry Score/Comments: 28.57         Debbie Trujillo, PT  2/27/2019

## 2019-03-05 ENCOUNTER — HOSPITAL ENCOUNTER (OUTPATIENT)
Dept: PHYSICAL THERAPY | Facility: HOSPITAL | Age: 54
Setting detail: THERAPIES SERIES
Discharge: HOME | End: 2019-03-05

## 2019-03-05 ENCOUNTER — APPOINTMENT (OUTPATIENT)
Dept: PHYSICAL THERAPY | Facility: HOSPITAL | Age: 54
End: 2019-03-05

## 2019-03-05 DIAGNOSIS — M54.50 PAIN, LUMBAR REGION: Primary | ICD-10-CM

## 2019-03-05 PROCEDURE — 97110 THERAPEUTIC EXERCISES: CPT

## 2019-03-05 PROCEDURE — G0283 ELEC STIM OTHER THAN WOUND: HCPCS

## 2019-03-05 PROCEDURE — 97140 MANUAL THERAPY 1/> REGIONS: CPT

## 2019-03-05 NOTE — PROGRESS NOTES
Outpatient Physical Therapy Ortho Treatment Note   [facility]     Patient Name: Lisa Gardner  : 1965  MRN: [id number]  Today's Date: 3/5/2019      Visit Date: 2019    Subjective Improvement:  NA     Attendance:   Approved:     15 visits/year per secondary      MD follow up:    3-11-19        date:     3-20-19    Visit Dx:    ICD-10-CM ICD-9-CM   1. Pain, lumbar region M54.5 724.2       Patient Active Problem List   Diagnosis   • Essential hypertension   • Precordial pain   • Severe episode of recurrent major depressive disorder, without psychotic features (CMS/HCC)   • Social anxiety disorder   • Suicidal overdose (CMS/HCC)        Past Medical History:   Diagnosis Date   • Alcohol abuse    • Anxiety    • Depression    • Hypertension    • Menopause    • Substance abuse    • Suicide attempt         Past Surgical History:   Procedure Laterality Date   • BLADDER SURGERY      bladder stretched as a child   • TONSILLECTOMY     • TUBAL ABDOMINAL LIGATION         PT Ortho     Row Name 19 0800       Posture/Observations    Posture/Observations Comments  L posterior torsion of innominate  -TM      User Key  (r) = Recorded By, (t) = Taken By, (c) = Cosigned By    Initials Name Provider Type    Violet Nobles, PTA Physical Therapy Assistant                      PT Assessment/Plan     Row Name 19 0800          PT Assessment    Assessment Comments  Pt able to perform spinal protection transfers supine <-> sit with cues.  DEmos good trans ab contraction.  Tolerated therex well.  -TM     Patient/caregiver participated in establishment of treatment plan and goals  Yes  -TM        PT Plan    PT Frequency  2x/week  -TM     Predicted Duration of Therapy Intervention (Therapy Eval)  6-8 weeks  -TM     PT Plan Comments  Cont stretching & core stab.  Isometric HS over ball.  -TM       User Key  (r) = Recorded By, (t) = Taken By, (c) = Cosigned By    Initials Name Provider  "Type    TM Violet Omer PTA Physical Therapy Assistant          Modalities     Row Name 03/05/19 0800             Moist Heat    MH Applied  Yes  -TM      Location  Applied to back in conjunction with Estim  -TM      Rx Minutes  15 mins  -TM      MH S/P Rx  Yes  -TM         ELECTRICAL STIMULATION    Attended/Unattended  Unattended  -TM      Stimulation Type  IFC  -TM      Location/Electrode Placement/Other  low back/SI  -TM       PT E-Stim Unattended (Manual) Minutes  15  -TM        User Key  (r) = Recorded By, (t) = Taken By, (c) = Cosigned By    Initials Name Provider Type    TM Violet Omer PTA Physical Therapy Assistant          Exercises     Row Name 03/05/19 0800             Subjective Comments    Subjective Comments  Pt reports pain in low basck & L SI area.  -TM         Subjective Pain    Able to rate subjective pain?  yes  -TM      Pre-Treatment Pain Level  8  -TM         Exercise 1    Exercise Name 1  seated HS stretch  -TM      Sets 1  2  -TM      Time 1  30\"  -TM      Additional Comments  bilateral  -TM         Exercise 2    Exercise Name 2  supine piriformis stretch  -TM      Sets 2  2  -TM      Time 2  30\"  -TM      Additional Comments  bilateral  -TM         Exercise 3    Exercise Name 3  SKTC stretch  -TM      Sets 3  2  -TM      Time 3  30\"  -TM      Additional Comments  bilateral  -TM         Exercise 4    Exercise Name 4  isometric trans abs  -TM      Sets 4  2  -TM      Reps 4  10  -TM      Time 4  5\" hold  -TM         Exercise 5    Exercise Name 5  hip Add squeeze  -TM      Sets 5  2  -TM      Reps 5  10  -TM      Time 5  5\" hold  -TM         Exercise 6    Exercise Name 6  DKTC ball roll with trans abs  -TM      Sets 6  2  -TM      Reps 6  10  -TM      Time 6  5\" hold  -TM         Exercise 7    Exercise Name 7  assess alignment/MET  -TM      Time 7  6 min  -TM         Exercise 8    Exercise Name 8  manual- see flowsheet  -TM      Time 8  6 min  -TM        User Key  (r) = " Recorded By, (t) = Taken By, (c) = Cosigned By    Initials Name Provider Type    TM Violet Omer PTA Physical Therapy Assistant                        Manual Rx (last 36 hours)      Manual Treatments     Row Name 03/05/19 0900             Manual Rx 1    Manual Rx 1 Location  L innominate  -TM      Manual Rx 1 Type  MET for posterior torsion  -TM      Manual Rx 1 Duration  6 min  -TM         Manual Rx 2    Manual Rx 2 Location  lumbar parsaspinals  -TM      Manual Rx 2 Type  STM/MFR  -TM      Manual Rx 2 Duration  6 min  -TM        User Key  (r) = Recorded By, (t) = Taken By, (c) = Cosigned By    Initials Name Provider Type    TM Violet Omer, REINA Physical Therapy Assistant          PT OP Goals     Row Name 03/05/19 0800          PT Short Term Goals    STG Date to Achieve  03/13/19  -TM     STG 1  Patient will report lower back pain no greater than 5/10  -TM     STG 1 Progress  Ongoing  -TM     STG 2  Patient will demonstrate independence with HEP  -TM     STG 2 Progress  Ongoing  -TM     STG 3  Patient will demonstrate good postural alignment  -TM     STG 3 Progress  Ongoing  -TM        Long Term Goals    LTG Date to Achieve  04/25/19  -TM     LTG 1  Patient will report pain no greater than 3/10  -TM     LTG 1 Progress  Ongoing  -TM     LTG 2  Patient will demonstrate independence with home program for pain management.  -TM     LTG 2 Progress  Ongoing  -TM     LTG 3  Patient will be independent with HEP.  -TM     LTG 3 Progress  Ongoing  -TM       User Key  (r) = Recorded By, (t) = Taken By, (c) = Cosigned By    Initials Name Provider Type     Violet Omer PTA Physical Therapy Assistant                         Time Calculation:   Start Time: 0849  Stop Time: 0941  Time Calculation (min): 52 min  Total Timed Code Minutes- PT: 37 minute(s)  Therapy Suggested Charges     Code   Minutes Charges    74258 (CPT®) Hc Pt Neuromusc Re Education Ea 15 Min      78662 (CPT®) Hc Pt Ther Proc Ea 15 Min       39929 (CPT®) Hc Gait Training Ea 15 Min      20777 (CPT®) Hc Pt Therapeutic Act Ea 15 Min      91008 (CPT®) Hc Pt Manual Therapy Ea 15 Min      78101 (CPT®) Hc Pt Ther Massage- Per 15 Min      93755 (CPT®) Hc Pt Iontophoresis Ea 15 Min      95766 (CPT®) Hc Pt Elec Stim Ea-Per 15 Min      93252 (CPT®) Hc Pt Ultrasound Ea 15 Min      96646 (CPT®) Hc Pt Self Care/Mgmt/Train Ea 15 Min      90454 (CPT®) Hc Pt Prosthetic (S) Train Initial Encounter, Each 15 Min      13660 (CPT®) Hc Orthotic(S) Mgmt/Train Initial Encounter, Each 15min      85178 (CPT®) Hc Pt Aquatic Therapy Ea 15 Min      90125 (CPT®) Hc Pt Orthotic(S)/Prosthetic(S) Encounter, Each 15 Min       (CPT®) Hc Pt Electrical Stim Unattended 15 1    Total  15 1        Therapy Charges for Today     Code Description Service Date Service Provider Modifiers Qty    25145685501 HC PT MANUAL THERAPY EA 15 MIN 3/5/2019 Violet Omer, PTA GP 1    49417911589 HC PT THER PROC EA 15 MIN 3/5/2019 Violet Omer, PTA GP 1    71895535915 HC PT ELECTRICAL STIM UNATTENDED 3/5/2019 Violet Omer, PTA  1                    Violet Omer, PTA  3/5/2019

## 2019-03-07 ENCOUNTER — APPOINTMENT (OUTPATIENT)
Dept: PHYSICAL THERAPY | Facility: HOSPITAL | Age: 54
End: 2019-03-07

## 2019-03-07 ENCOUNTER — HOSPITAL ENCOUNTER (OUTPATIENT)
Dept: PHYSICAL THERAPY | Facility: HOSPITAL | Age: 54
Setting detail: THERAPIES SERIES
Discharge: HOME | End: 2019-03-07

## 2019-03-07 DIAGNOSIS — M54.50 PAIN, LUMBAR REGION: Primary | ICD-10-CM

## 2019-03-07 PROCEDURE — G0283 ELEC STIM OTHER THAN WOUND: HCPCS

## 2019-03-07 PROCEDURE — 97140 MANUAL THERAPY 1/> REGIONS: CPT

## 2019-03-07 PROCEDURE — 97110 THERAPEUTIC EXERCISES: CPT

## 2019-03-07 NOTE — PROGRESS NOTES
Outpatient Physical Therapy Ortho Treatment Note   Surjit Galindo     Patient Name: Lisa Gardner  : 1965  MRN: [id number]  Today's Date: 3/7/2019      Visit Date: 2019    Subjective Improvement:     NA  Attendance:  3/3  Approved:      15 visits/year per secondary     MD follow up:   3-11-19         date:    3-20-19     Visit Dx:    ICD-10-CM ICD-9-CM   1. Pain, lumbar region M54.5 724.2       Patient Active Problem List   Diagnosis   • Essential hypertension   • Precordial pain   • Severe episode of recurrent major depressive disorder, without psychotic features (CMS/HCC)   • Social anxiety disorder   • Suicidal overdose (CMS/HCC)        Past Medical History:   Diagnosis Date   • Alcohol abuse    • Anxiety    • Depression    • Hypertension    • Menopause    • Substance abuse    • Suicide attempt         Past Surgical History:   Procedure Laterality Date   • BLADDER SURGERY      bladder stretched as a child   • TONSILLECTOMY     • TUBAL ABDOMINAL LIGATION         PT Ortho     Row Name 19 1000       Subjective Pain    Post-Treatment Pain Level  8  -TM       Posture/Observations    Posture/Observations Comments  L posterior torsion of innominate;   -TM    Row Name 19 0800       Subjective Comments    Subjective Comments  Pt reports pain in low back & L SI area.  -TM       Subjective Pain    Post-Treatment Pain Level  8  -TM       Posture/Observations    Posture/Observations Comments  L posterior torsion of innominate  -TM      User Key  (r) = Recorded By, (t) = Taken By, (c) = Cosigned By    Initials Name Provider Type    Violet Nobles, PTA Physical Therapy Assistant                      PT Assessment/Plan     Row Name 19 1100          PT Assessment    Assessment Comments  Pt conts with pelvic malalignment which improved with MET.  Tolerated therex well.    -TM     Patient/caregiver participated in establishment of treatment plan and goals  Yes  -TM      "   PT Plan    PT Frequency  2x/week  -TM     Predicted Duration of Therapy Intervention (Therapy Eval)  6-8 weeks  -TM     PT Plan Comments  MD followup 3-11.  Note sent with pt. Cont monitoring alignment.   -TM       User Key  (r) = Recorded By, (t) = Taken By, (c) = Cosigned By    Initials Name Provider Type    TM Violet Omer, REINA Physical Therapy Assistant          Modalities     Row Name 03/07/19 1000             Moist Heat    MH Applied  Yes  -TM      Location  Applied to back in conjunction with Estim  -TM      Rx Minutes  15 mins  -TM      MH S/P Rx  Yes  -TM         ELECTRICAL STIMULATION    Attended/Unattended  Unattended  -TM      Stimulation Type  IFC  -TM      Location/Electrode Placement/Other  low back/SI  -TM       PT E-Stim Unattended (Manual) Minutes  15  -TM        User Key  (r) = Recorded By, (t) = Taken By, (c) = Cosigned By    Initials Name Provider Type    TM OmerViolet harper, REINA Physical Therapy Assistant          Exercises     Row Name 03/07/19 1000             Subjective Comments    Subjective Comments  Pt reports continued pain in L SI area.    -TM         Subjective Pain    Able to rate subjective pain?  yes  -TM      Pre-Treatment Pain Level  9  -TM      Post-Treatment Pain Level  8  -TM         Exercise 1    Exercise Name 1  seated HS stretch  -TM      Sets 1  2  -TM      Time 1  30\"  -TM      Additional Comments  bilateral  -TM         Exercise 2    Exercise Name 2  supine piriformis stretch  -TM      Sets 2  2  -TM      Time 2  30\"  -TM      Additional Comments  bilateral  -TM         Exercise 3    Exercise Name 3  SKTC stretch  -TM      Sets 3  2  -TM      Time 3  30\"  -TM      Additional Comments  bilateral  -TM         Exercise 4    Exercise Name 4  trans abs with Add  -TM      Sets 4  2  -TM      Reps 4  10  -TM         Exercise 5    Exercise Name 5  T Band hip Abd  -TM      Sets 5  2  -TM      Reps 5  10  -TM      Additional Comments  red  -TM         Exercise 6 " "   Exercise Name 6  DKTC ball roll with trans abs  -TM      Sets 6  2  -TM      Reps 6  10  -TM         Exercise 7    Exercise Name 7  isometric HS over ball  -TM      Reps 7  20  -TM      Time 7  5\" hold  -TM         Exercise 8    Exercise Name 8  assess alignment/MET  -TM      Time 8  8 min  -TM         Exercise 9    Exercise Name 9  manual- see flowsheet  -TM      Time 9  8 min  -TM        User Key  (r) = Recorded By, (t) = Taken By, (c) = Cosigned By    Initials Name Provider Type     Violet Omer PTA Physical Therapy Assistant                        Manual Rx (last 36 hours)      Manual Treatments     Row Name 03/07/19 1100             Manual Rx 1    Manual Rx 1 Location  L innominate  -TM      Manual Rx 1 Type  MET for posterior torsion  -TM      Manual Rx 1 Duration  5 min  -TM         Manual Rx 2    Manual Rx 2 Location  L LE  -TM      Manual Rx 2 Type  distraction  -TM      Manual Rx 2 Duration  3 min  -TM         Manual Rx 3    Manual Rx 3 Location  lumbar paraspinals (prone over pillow)  -TM      Manual Rx 3 Type  STM/MFR  -TM      Manual Rx 3 Duration  8 min  -TM        User Key  (r) = Recorded By, (t) = Taken By, (c) = Cosigned By    Initials Name Provider Type    TM Violet Omer PTA Physical Therapy Assistant          PT OP Goals     Row Name 03/07/19 1200          PT Short Term Goals    STG Date to Achieve  03/13/19  -TM     STG 1  Patient will report lower back pain no greater than 5/10  -TM     STG 1 Progress  Ongoing  -TM     STG 2  Patient will demonstrate independence with HEP  -TM     STG 2 Progress  Ongoing  -TM     STG 3  Patient will demonstrate good postural alignment  -TM     STG 3 Progress  Ongoing  -TM        Long Term Goals    LTG Date to Achieve  04/25/19  -TM     LTG 1  Patient will report pain no greater than 3/10  -TM     LTG 1 Progress  Ongoing  -TM     LTG 2  Patient will demonstrate independence with home program for pain management.  -TM     LTG 2 Progress  " Ongoing  -TM     LTG 3  Patient will be independent with HEP.  -TM     LTG 3 Progress  Ongoing  -TM       User Key  (r) = Recorded By, (t) = Taken By, (c) = Cosigned By    Initials Name Provider Type    TM Violet Omer, REINA Physical Therapy Assistant                         Time Calculation:   Start Time: 1100  Stop Time: 1153  Time Calculation (min): 53 min  Total Timed Code Minutes- PT: 38 minute(s)  Therapy Suggested Charges     Code   Minutes Charges    67192 (CPT®) Hc Pt Neuromusc Re Education Ea 15 Min      97718 (CPT®) Hc Pt Ther Proc Ea 15 Min      32175 (CPT®) Hc Gait Training Ea 15 Min      12246 (CPT®) Hc Pt Therapeutic Act Ea 15 Min      82761 (CPT®) Hc Pt Manual Therapy Ea 15 Min      94360 (CPT®) Hc Pt Ther Massage- Per 15 Min      14538 (CPT®) Hc Pt Iontophoresis Ea 15 Min      14424 (CPT®) Hc Pt Elec Stim Ea-Per 15 Min      17151 (CPT®) Hc Pt Ultrasound Ea 15 Min      32295 (CPT®) Hc Pt Self Care/Mgmt/Train Ea 15 Min      35901 (CPT®) Hc Pt Prosthetic (S) Train Initial Encounter, Each 15 Min      55858 (CPT®) Hc Orthotic(S) Mgmt/Train Initial Encounter, Each 15min      55960 (CPT®) Hc Pt Aquatic Therapy Ea 15 Min      38069 (CPT®) Hc Pt Orthotic(S)/Prosthetic(S) Encounter, Each 15 Min       (CPT®) Hc Pt Electrical Stim Unattended 15 1    Total  15 1        Therapy Charges for Today     Code Description Service Date Service Provider Modifiers Qty    84706648386 HC PT THER PROC EA 15 MIN 3/7/2019 Violet Omer, PTA GP 2    32378008335 HC PT MANUAL THERAPY EA 15 MIN 3/7/2019 Violet Omer, PTA GP 1    60047867490 HC PT ELECTRICAL STIM UNATTENDED 3/7/2019 Violet Omer, PTA  1                    Violet Omer, REINA  3/7/2019

## 2019-03-12 ENCOUNTER — HOSPITAL ENCOUNTER (OUTPATIENT)
Dept: PHYSICAL THERAPY | Facility: HOSPITAL | Age: 54
Setting detail: THERAPIES SERIES
Discharge: HOME | End: 2019-03-12

## 2019-03-12 DIAGNOSIS — M54.50 PAIN, LUMBAR REGION: Primary | ICD-10-CM

## 2019-03-12 PROCEDURE — 97140 MANUAL THERAPY 1/> REGIONS: CPT

## 2019-03-12 PROCEDURE — 97110 THERAPEUTIC EXERCISES: CPT

## 2019-03-12 NOTE — PROGRESS NOTES
Outpatient Physical Therapy Ortho Treatment Note   Surjit Galindo     Patient Name: Lisa Gardner  : 1965  MRN: [id number]  Today's Date: 3/12/2019      Visit Date: 2019    Subjective Improvement:     None yet  Attendance:   Approved:       15 visits/year per secondary    MD follow up:      3-11-19      date:    3-20-19     Visit Dx:    ICD-10-CM ICD-9-CM   1. Pain, lumbar region M54.5 724.2       Patient Active Problem List   Diagnosis   • Essential hypertension   • Precordial pain   • Severe episode of recurrent major depressive disorder, without psychotic features (CMS/HCC)   • Social anxiety disorder   • Suicidal overdose (CMS/HCC)        Past Medical History:   Diagnosis Date   • Alcohol abuse    • Anxiety    • Depression    • Hypertension    • Menopause    • Substance abuse    • Suicide attempt         Past Surgical History:   Procedure Laterality Date   • BLADDER SURGERY      bladder stretched as a child   • TONSILLECTOMY     • TUBAL ABDOMINAL LIGATION         PT Ortho     Row Name 19 1000       Subjective Comments    Subjective Comments  Reports that she had back spasms so bad on 3-10 that she couldn't finish cooking dinner.  Saw MD yesterday & got new RX for Baclofin, but hasn't picked it up yet.  -TM       Subjective Pain    Able to rate subjective pain?  yes  -TM    Post-Treatment Pain Level  7  -TM       Posture/Observations    Posture/Observations Comments  posterior torsion L innominate  -TM      User Key  (r) = Recorded By, (t) = Taken By, (c) = Cosigned By    Initials Name Provider Type    TM Violet Omer, PTA Physical Therapy Assistant                      PT Assessment/Plan     Row Name 19 1000          PT Assessment    Assessment Comments  Pt's alignment corrected more easily today.  Tolerated new therex well.  Pt deferred IFC/MHP.    -TM     Patient/caregiver participated in establishment of treatment plan and goals  Yes  -TM        PT  "Plan    PT Frequency  2x/week  -TM     Predicted Duration of Therapy Intervention (Therapy Eval)  6-8 weeks  -TM     PT Plan Comments  Progress to seated PN core stability.  -TM       User Key  (r) = Recorded By, (t) = Taken By, (c) = Cosigned By    Initials Name Provider Type    TM Violet Omer, REINA Physical Therapy Assistant              Exercises     Row Name 03/12/19 1000             Subjective Comments    Subjective Comments  Reports that she had back spasms so bad on 3-10 that she couldn't finish cooking dinner.  Saw MD yesterday & got new RX for Baclofin, but hasn't picked it up yet.  -TM         Subjective Pain    Able to rate subjective pain?  yes  -TM      Pre-Treatment Pain Level  8  -TM      Post-Treatment Pain Level  7  -TM         Exercise 1    Exercise Name 1  seated HS stretch  -TM      Sets 1  2  -TM      Time 1  30\"  -TM      Additional Comments  bilateral  -TM         Exercise 2    Exercise Name 2  supine piriformis stretch  -TM      Sets 2  2  -TM      Time 2  30\"  -TM      Additional Comments  bilateral  -TM         Exercise 3    Exercise Name 3  SKTC stretch  -TM      Sets 3  2  -TM      Time 3  30\"  -TM      Additional Comments  bilateral  -TM         Exercise 4    Exercise Name 4  asses alignment/MET  -TM      Time 4  6 min  -TM      Additional Comments  L posterior torsion  -TM         Exercise 5    Exercise Name 5  trans abs with  Add  -TM      Sets 5  2  -TM      Reps 5  10  -TM      Time 5  5\" hold  -TM         Exercise 6    Exercise Name 6  DKTC ball roll with trans abs  -TM      Sets 6  2  -TM      Reps 6  10  -TM         Exercise 7    Exercise Name 7  isometric HS over ball  -TM      Reps 7  20  -TM      Time 7  5\" hold  -TM         Exercise 8    Exercise Name 8  prone over pillow TKE/GS  -TM      Reps 8  20  -TM      Time 8  5\" hold  -TM         Exercise 9    Exercise Name 9  prone over pillow T Band hip IR  -TM      Reps 9  20  -TM      Additional Comments  yellow  -TM         " Exercise 10    Exercise Name 10  manual- see flowsheet  -TM      Time 10  10 min  -TM        User Key  (r) = Recorded By, (t) = Taken By, (c) = Cosigned By    Initials Name Provider Type     Violet Omer PTA Physical Therapy Assistant                        Manual Rx (last 36 hours)      Manual Treatments     Row Name 03/12/19 0900             Manual Rx 1    Manual Rx 1 Location  L innominate  -TM      Manual Rx 1 Type  MET for posterior torsion  -TM      Manual Rx 1 Duration  5 min  -TM         Manual Rx 2    Manual Rx 2 Location  lumbar/thoracic paraspinals  -TM      Manual Rx 2 Type  STM/MFR  -TM      Manual Rx 2 Duration  8 min  -TM        User Key  (r) = Recorded By, (t) = Taken By, (c) = Cosigned By    Initials Name Provider Type     Violet Omer PTA Physical Therapy Assistant          PT OP Goals     Row Name 03/12/19 1000          PT Short Term Goals    STG Date to Achieve  03/13/19  -TM     STG 1  Patient will report lower back pain no greater than 5/10  -TM     STG 1 Progress  Ongoing  -TM     STG 2  Patient will demonstrate independence with HEP  -TM     STG 2 Progress  Ongoing  -TM     STG 3  Patient will demonstrate good postural alignment  -TM     STG 3 Progress  Ongoing  -TM        Long Term Goals    LTG Date to Achieve  04/25/19  -TM     LTG 1  Patient will report pain no greater than 3/10  -TM     LTG 1 Progress  Ongoing  -TM     LTG 2  Patient will demonstrate independence with home program for pain management.  -TM     LTG 2 Progress  Ongoing  -TM     LTG 3  Patient will be independent with HEP.  -TM     LTG 3 Progress  Ongoing  -TM       User Key  (r) = Recorded By, (t) = Taken By, (c) = Cosigned By    Initials Name Provider Type     Violet Omer PTA Physical Therapy Assistant                         Time Calculation:   Start Time: 1008  Stop Time: 1055  Time Calculation (min): 47 min  Total Timed Code Minutes- PT: 47 minute(s)  Therapy Suggested Charges     Code    Minutes Charges    None           Therapy Charges for Today     Code Description Service Date Service Provider Modifiers Qty    01427617973 HC PT THER PROC EA 15 MIN 3/12/2019 Violet Omer, PTA GP 2    25782974731 HC PT MANUAL THERAPY EA 15 MIN 3/12/2019 Violet Omer, PTA GP 1                    Violet Omer, PTA  3/12/2019

## 2019-03-14 ENCOUNTER — APPOINTMENT (OUTPATIENT)
Dept: PHYSICAL THERAPY | Facility: HOSPITAL | Age: 54
End: 2019-03-14

## 2019-03-19 ENCOUNTER — HOSPITAL ENCOUNTER (OUTPATIENT)
Dept: PHYSICAL THERAPY | Facility: HOSPITAL | Age: 54
Setting detail: THERAPIES SERIES
Discharge: HOME | End: 2019-03-19

## 2019-03-19 DIAGNOSIS — M54.50 PAIN, LUMBAR REGION: Primary | ICD-10-CM

## 2019-03-19 PROCEDURE — 97140 MANUAL THERAPY 1/> REGIONS: CPT

## 2019-03-19 PROCEDURE — G0283 ELEC STIM OTHER THAN WOUND: HCPCS

## 2019-03-19 PROCEDURE — 97110 THERAPEUTIC EXERCISES: CPT

## 2019-03-19 NOTE — PROGRESS NOTES
Outpatient Physical Therapy Ortho Treatment Note   Surjit Galindo     Patient Name: Lisa Gardner  : 1965  MRN: [id number]  Today's Date: 3/19/2019      Visit Date: 2019    Subjective Improvement:   none   Attendance:   Approved:     15 visits/year per secondary      MD follow up:     3-11-19       date:     3-20-19    Visit Dx:    ICD-10-CM ICD-9-CM   1. Pain, lumbar region M54.5 724.2       Patient Active Problem List   Diagnosis   • Essential hypertension   • Precordial pain   • Severe episode of recurrent major depressive disorder, without psychotic features (CMS/HCC)   • Social anxiety disorder   • Suicidal overdose (CMS/HCC)        Past Medical History:   Diagnosis Date   • Alcohol abuse    • Anxiety    • Depression    • Hypertension    • Menopause    • Substance abuse    • Suicide attempt         Past Surgical History:   Procedure Laterality Date   • BLADDER SURGERY      bladder stretched as a child   • TONSILLECTOMY     • TUBAL ABDOMINAL LIGATION         PT Ortho     Row Name 19 1100       Subjective Pain    Able to rate subjective pain?  yes  -TM    Pre-Treatment Pain Level  8  -TM       Posture/Observations    Posture/Observations Comments  L innominate outflared & posterior rotated  -TM      User Key  (r) = Recorded By, (t) = Taken By, (c) = Cosigned By    Initials Name Provider Type    Violet Nobles PTA Physical Therapy Assistant                      PT Assessment/Plan     Row Name 19 1100          PT Assessment    Assessment Comments  Pt again presented with pelvic malalignment.  Was unable to fully correct today.  Tried SL clamshell with LLE & pt had increased pain & was unable to complete.  IFC/MH gives some relief of pain.   -TM     Patient/caregiver participated in establishment of treatment plan and goals  Yes  -TM        PT Plan    PT Frequency  2x/week  -TM     Predicted Duration of Therapy Intervention (Therapy Eval)  6-8 weeks  -TM   "   PT Plan Comments  Cont monitoring alignment.  Progress to seated PN as tolerated.  -TM       User Key  (r) = Recorded By, (t) = Taken By, (c) = Cosigned By    Initials Name Provider Type    TM Violet Omer PTA Physical Therapy Assistant          Modalities     Row Name 03/19/19 1100             Moist Heat    MH Applied  Yes  -TM      Location  Applied to back in conjunction with Estim  -TM      Rx Minutes  15 mins  -TM      MH S/P Rx  Yes  -TM         ELECTRICAL STIMULATION    Attended/Unattended  Unattended  -TM      Stimulation Type  IFC  -TM      Location/Electrode Placement/Other  low back/SI  -TM       PT E-Stim Unattended (Manual) Minutes  15  -TM        User Key  (r) = Recorded By, (t) = Taken By, (c) = Cosigned By    Initials Name Provider Type    TM Violet Omer PTA Physical Therapy Assistant        Exercises     Row Name 03/19/19 1100             Subjective Comments    Subjective Comments  Reports having a  \"pinching\" sensation in lumbar spine when stretching L piriformis.  -TM         Subjective Pain    Able to rate subjective pain?  yes  -TM      Pre-Treatment Pain Level  8  -TM         Exercise 1    Exercise Name 1  seated HS stretch  -TM      Sets 1  2  -TM      Time 1  30\"  -TM         Exercise 2    Exercise Name 2  supine piriformis stretch  -TM      Sets 2  2  -TM      Time 2  30\"  -TM         Exercise 3    Exercise Name 3  SKTC stretch  -TM      Sets 3  2  -TM      Time 3  30\"  -TM         Exercise 4    Exercise Name 4  asses alignment/MET  -TM      Time 4  6 min  -TM      Additional Comments  L posterior rotated & outflared  -TM         Exercise 5    Exercise Name 5  prone TKE/GS  -TM      Sets 5  2  -TM      Reps 5  10  -TM      Time 5  5\" hold  -TM         Exercise 6    Exercise Name 6  prone hip IR  -TM      Sets 6  2  -TM      Reps 6  10  -TM      Additional Comments  yellow  -TM         Exercise 7    Exercise Name 7  --  -TM      Sets 7  --  -TM      Reps 7  --  -TM   "    Additional Comments  --  -TM        User Key  (r) = Recorded By, (t) = Taken By, (c) = Cosigned By    Initials Name Provider Type    ANNETTE Violet Omer PTA Physical Therapy Assistant                      Manual Rx (last 36 hours)      Manual Treatments     Row Name 03/19/19 1100             Manual Rx 1    Manual Rx 1 Location  L innominate  -TM      Manual Rx 1 Type  MET for posterior torsion & outflare  -TM      Manual Rx 1 Duration  6 min  -TM         Manual Rx 2    Manual Rx 2 Location  L LE distraction  -TM      Manual Rx 2 Duration  2 min  -TM        User Key  (r) = Recorded By, (t) = Taken By, (c) = Cosigned By    Initials Name Provider Type    TM Violet Omer, REINA Physical Therapy Assistant          PT OP Goals     Row Name 03/19/19 1100          PT Short Term Goals    STG Date to Achieve  03/13/19  -TM     STG 1  Patient will report lower back pain no greater than 5/10  -TM     STG 1 Progress  Ongoing  -TM     STG 2  Patient will demonstrate independence with HEP  -TM     STG 2 Progress  Ongoing  -TM     STG 3  Patient will demonstrate good postural alignment  -TM     STG 3 Progress  Ongoing  -TM        Long Term Goals    LTG Date to Achieve  04/25/19  -TM     LTG 1  Patient will report pain no greater than 3/10  -TM     LTG 1 Progress  Ongoing  -TM     LTG 2  Patient will demonstrate independence with home program for pain management.  -TM     LTG 2 Progress  Ongoing  -TM     LTG 3  Patient will be independent with HEP.  -TM     LTG 3 Progress  Ongoing  -TM       User Key  (r) = Recorded By, (t) = Taken By, (c) = Cosigned By    Initials Name Provider Type    ANNETTE Violet Omer PTA Physical Therapy Assistant                         Time Calculation:   Start Time: 1102  Stop Time: 1149  Time Calculation (min): 47 min  Total Timed Code Minutes- PT: 32 minute(s)  Therapy Charges for Today     Code Description Service Date Service Provider Modifiers Qty    27476371598  PT MANUAL THERAPY EA  15 MIN 3/19/2019 Violet Omer, PTA GP 1    84366822592 HC PT THER PROC EA 15 MIN 3/19/2019 Violet Omer, PTA GP 1    23844696600 HC PT ELECTRICAL STIM UNATTENDED 3/19/2019 Violet Omer, PTA  1                    Violet Omer, PTA  3/19/2019

## 2019-03-21 ENCOUNTER — HOSPITAL ENCOUNTER (OUTPATIENT)
Dept: PHYSICAL THERAPY | Facility: HOSPITAL | Age: 54
Setting detail: THERAPIES SERIES
Discharge: HOME | End: 2019-03-21

## 2019-03-21 DIAGNOSIS — M54.50 PAIN, LUMBAR REGION: Primary | ICD-10-CM

## 2019-03-21 PROCEDURE — 97140 MANUAL THERAPY 1/> REGIONS: CPT | Performed by: PHYSICAL THERAPIST

## 2019-03-21 PROCEDURE — 97110 THERAPEUTIC EXERCISES: CPT | Performed by: PHYSICAL THERAPIST

## 2019-03-21 PROCEDURE — G0283 ELEC STIM OTHER THAN WOUND: HCPCS | Performed by: PHYSICAL THERAPIST

## 2019-03-21 NOTE — THERAPY PROGRESS REPORT/RE-CERT
Outpatient Physical Therapy Ortho Progress Note  [facility]     Patient Name: Lisa Gardner  : 1965  MRN: [id number]  Today's Date: 3/21/2019      Visit Date: 2019  6/7 (15) MD follow up ??, Ellisrt 19  Visit Dx:    ICD-10-CM ICD-9-CM   1. Pain, lumbar region M54.5 724.2       Patient Active Problem List   Diagnosis   • Essential hypertension   • Precordial pain   • Severe episode of recurrent major depressive disorder, without psychotic features (CMS/HCC)   • Social anxiety disorder   • Suicidal overdose (CMS/HCC)        Past Medical History:   Diagnosis Date   • Alcohol abuse    • Anxiety    • Depression    • Hypertension    • Menopause    • Substance abuse    • Suicide attempt         Past Surgical History:   Procedure Laterality Date   • BLADDER SURGERY      bladder stretched as a child   • TONSILLECTOMY     • TUBAL ABDOMINAL LIGATION         PT Ortho     Row Name 19 1100       Subjective Pain    Pre-Treatment Pain Level  8  -DD       Posture/Observations    Posture/Observations Comments  Patient presents with half inch leg length discrepancy on the left due to innominate rotation.  She is elevated on the right sacral base audible cavitiation with shotgun maneuver  -DD       Quarter Clearing    Quarter Clearing  -- negative SLR  -DD       Special Tests/Palpation    Special Tests/Palpation  -- Tenderness to palpation in the right ASIS and PSIS  -DD       General ROM    GENERAL ROM COMMENTS  Trunk flexion is 8 inches from floor, extension 10 degrees, lateral flexion 50% bilaterally  -DD       MMT (Manual Muscle Testing)    General MMT Comments  She has cogwheeling with manual muscle testing of bilateral hip flexors with resistance of 4/5.  Quadriceps 5/5 bilaterally, hamstrings 4+, hip abductors 3+ left and 4- right.  -DD       Sensation    Sensation WNL?  WNL  -DD    Light Touch  No apparent deficits  -DD    Row Name 19 1100       Subjective Pain    Able to rate  subjective pain?  yes  -TM    Pre-Treatment Pain Level  8  -TM       Posture/Observations    Posture/Observations Comments  L innominate outflared & posterior rotated  -TM      User Key  (r) = Recorded By, (t) = Taken By, (c) = Cosigned By    Initials Name Provider Type    TM Violet Omer, PTA Physical Therapy Assistant    Kayleigh Watkins, PT DPT Physical Therapist            PT Assessment/Plan     Row Name 03/21/19 1157          PT Assessment    Assessment Comments  Sleeping better with change in muscle relaxor.  Discontinued LTR stretch, Tolerated Manual therapy and exercise today.  Pain 6-7 post treatment. May benefit from a TENS unit.  -DD     Please refer to paper survey for additional self-reported information  Yes  -DD     Rehab Potential  Fair  -DD     Patient/caregiver participated in establishment of treatment plan and goals  Yes  -DD     Patient would benefit from skilled therapy intervention  Yes  -DD        PT Plan    PT Frequency  2x/week  -DD     Predicted Duration of Therapy Intervention (Therapy Eval)  2 weeks  -DD     PT Plan Comments  continue to work on core stability. ADD physioball seated core stab. Modalities PRN.  -DD       User Key  (r) = Recorded By, (t) = Taken By, (c) = Cosigned By    Initials Name Provider Type    Kayleigh Watkins, PT DPT Physical Therapist          Modalities     Row Name 03/21/19 1103             Moist Heat    MH Applied  Yes  -DD      Location  Applied to back in conjunction with Estim  -DD      Rx Minutes  15 mins  -DD      MH S/P Rx  Yes  -DD         ELECTRICAL STIMULATION    Attended/Unattended  Unattended  -DD      Stimulation Type  IFC  -DD      Location/Electrode Placement/Other  low back/SI  -DD       PT E-Stim Unattended (Manual) Minutes  15  -DD        User Key  (r) = Recorded By, (t) = Taken By, (c) = Cosigned By    Initials Name Provider Type    Kayleigh Watkins, PT DPT Physical Therapist        Exercises     Row Name  03/21/19 1100             Subjective Pain    Able to rate subjective pain?  yes  -DD      Pre-Treatment Pain Level  8  -DD         Exercise 1    Exercise Name 1  trunk ROM  -DD      Time 1  3'  -DD         Exercise 2    Exercise Name 2  resisted LE isomtrics  -DD      Time 2  3'  -DD         Exercise 3    Exercise Name 3  manual  -DD         Exercise 4    Exercise Name 4  Prone TKE with glut set  -DD      Reps 4  20  -DD         Exercise 5    Exercise Name 5  Prone Hip IR/ER  -DD      Reps 5  20  -DD      Additional Comments  yellow  -DD        User Key  (r) = Recorded By, (t) = Taken By, (c) = Cosigned By    Initials Name Provider Type    DD Kayleigh Hill, PT DPT Physical Therapist                Manual Rx (last 36 hours)      Manual Treatments     Row Name 03/21/19 1103             Manual Rx 1    Manual Rx 1 Location  L innominate  -DD      Manual Rx 1 Type  MET for posterior torsion & outflare/ Trunk rotation  -DD      Manual Rx 1 Duration  6 min  -DD         Manual Rx 2    Manual Rx 2 Location  L LE distraction  -DD      Manual Rx 2 Duration  2 min  -DD         Manual Rx 3    Manual Rx 3 Location  SI joints  -DD      Manual Rx 3 Type  ME for R elevated sacral base  -DD      Manual Rx 3 Duration  4'  -DD         Manual Rx 4    Manual Rx 4 Location  pelvis  -DD      Manual Rx 4 Type  shot gun maneuver  -DD      Manual Rx 4 Grade  5  -DD      Manual Rx 4 Duration  2'  -DD        User Key  (r) = Recorded By, (t) = Taken By, (c) = Cosigned By    Initials Name Provider Type    Kayleigh Watkins, PT DPT Physical Therapist          PT OP Goals     Row Name 03/21/19 1143 03/21/19 1103       PT Short Term Goals    STG Date to Achieve  --  03/13/19  -DD    STG 1  --  Patient will report lower back pain no greater than 5/10  -DD    STG 1 Progress  --  Ongoing  -DD    STG 1 Progress Comments  --  8  -DD    STG 2  --  Patient will demonstrate independence with HEP  -DD    STG 2 Progress  --   Ongoing;Progressing  -DD    STG 3  --  Pt will have 5/5 MMT of hip flexors  -DD    STG 3 Progress  --  Ongoing  -DD       Long Term Goals    LTG Date to Achieve  --  04/25/19  -DD    LTG 1  --  Patient will report pain no greater than 3/10  -DD    LTG 1 Progress  --  Not Met  -DD    LTG 2  --  Patient will demonstrate independence with home program for pain management.  -DD    LTG 2 Progress  --  Ongoing  -DD    LTG 3  --  Pt will present with level sacral base  -DD    LTG 3 Progress  --  Ongoing  -DD    LTG 4  --  Pt will present with no LLD  -DD       Time Calculation    PT Goal Re-Cert Due Date  04/11/19  -DD  --      User Key  (r) = Recorded By, (t) = Taken By, (c) = Cosigned By    Initials Name Provider Type    Kayleigh Watkins, PT DPT Physical Therapist             Outcome Measure Options: Modifed Owestry  Modified Oswestry  Modified Oswestry Score/Comments: 25=50%      Time Calculation:   Start Time: 1103  Stop Time: 1149  Time Calculation (min): 46 min  Total Timed Code Minutes- PT: 30 minute(s)  Therapy Charges for Today     Code Description Service Date Service Provider Modifiers Qty    91545027974 HC PT THER PROC EA 15 MIN 3/21/2019 Kayleigh Hill, PT DPT GP 1    65135527242 HC PT MANUAL THERAPY EA 15 MIN 3/21/2019 Kayleigh Hill, PT DPT GP 1    00004878073 HC PT ELECTRICAL STIM UNATTENDED 3/21/2019 Kayleigh Hill, PT DPT  1          PT G-Codes  Outcome Measure Options: Modifed Owestry  Modified Oswestry Score/Comments: 25=50%         Kayleigh Hill PT DPT  3/21/2019

## 2019-03-26 ENCOUNTER — HOSPITAL ENCOUNTER (OUTPATIENT)
Dept: PHYSICAL THERAPY | Facility: HOSPITAL | Age: 54
Setting detail: THERAPIES SERIES
Discharge: HOME | End: 2019-03-26

## 2019-03-26 DIAGNOSIS — M54.50 PAIN, LUMBAR REGION: Primary | ICD-10-CM

## 2019-03-26 PROCEDURE — 97110 THERAPEUTIC EXERCISES: CPT

## 2019-03-26 NOTE — PROGRESS NOTES
Outpatient Physical Therapy Ortho Treatment Note   [facility]     Patient Name: Lisa Gardner  : 1965  MRN: [id number]  Today's Date: 3/26/2019      Visit Date: 2019     Subjective Improvement:     none  Attendance:   Approved:      15 visits per secondary     MD follow up:     19       date:     3-20-19    Visit Dx:    ICD-10-CM ICD-9-CM   1. Pain, lumbar region M54.5 724.2       Patient Active Problem List   Diagnosis   • Essential hypertension   • Precordial pain   • Severe episode of recurrent major depressive disorder, without psychotic features (CMS/HCC)   • Social anxiety disorder   • Suicidal overdose (CMS/HCC)        Past Medical History:   Diagnosis Date   • Alcohol abuse    • Anxiety    • Depression    • Hypertension    • Menopause    • Substance abuse    • Suicide attempt         Past Surgical History:   Procedure Laterality Date   • BLADDER SURGERY      bladder stretched as a child   • TONSILLECTOMY     • TUBAL ABDOMINAL LIGATION         PT Ortho     Row Name 19 1000       Subjective Comments    Subjective Comments  Reports waking up Sat morning with excruiating pain in back & down L LE.  Took pain med & muscle relaxor to get some relief.  Still has back pain, but not down the leg today.   -TM       Subjective Pain    Able to rate subjective pain?  yes  -TM    Pre-Treatment Pain Level  8  -TM    Post-Treatment Pain Level  8  -TM       Posture/Observations    Posture/Observations Comments  L posteriorly rotated innominate  -TM      User Key  (r) = Recorded By, (t) = Taken By, (c) = Cosigned By    Initials Name Provider Type    TM Violet Omer PTA Physical Therapy Assistant                      PT Assessment/Plan     Row Name 19 1000          PT Assessment    Assessment Comments  Pt conts to have high pain level.  Alignment improved with MET.  Pt deferred modalities post RX.     -TM     Patient/caregiver participated in establishment of  "treatment plan and goals  Yes  -TM        PT Plan    PT Frequency  2x/week  -TM     Predicted Duration of Therapy Intervention (Therapy Eval)  2 weeks  -TM     PT Plan Comments  Cont working on seated core stability.  Add clarita disc as indicated.   -TM       User Key  (r) = Recorded By, (t) = Taken By, (c) = Cosigned By    Initials Name Provider Type    TM Violet Omer, PTA Physical Therapy Assistant            Exercises     Row Name 03/26/19 1000             Subjective Comments    Subjective Comments  Reports waking up Sat morning with excruiating pain in back & down L LE.  Took pain med & muscle relaxor to get some relief.  Still has back pain, but not down the leg today.   -TM         Subjective Pain    Able to rate subjective pain?  yes  -TM      Pre-Treatment Pain Level  8  -TM      Post-Treatment Pain Level  8  -TM         Exercise 1    Exercise Name 1  PRO II ROM/endurance  -TM      Time 1  8 min  -TM      Additional Comments  L 2  -TM         Exercise 2    Exercise Name 2  seated HS stretch  -TM      Sets 2  2  -TM      Time 2  30\"  -TM         Exercise 3    Exercise Name 3  supine piriformis stretch  -TM      Sets 3  2  -TM      Time 3  30\"  -TM         Exercise 4    Exercise Name 4  LTR  -TM      Reps 4  10  -TM      Time 4  5\" hold  -TM         Exercise 5    Exercise Name 5  assess alignment/ MET  -TM      Time 5  4 min  -TM      Additional Comments  L posterior rotation innominate  -TM         Exercise 6    Exercise Name 6  prone TKE/GS  -TM      Sets 6  2  -TM      Reps 6  10  -TM         Exercise 7    Exercise Name 7  prone hip T Band IR  -TM      Reps 7  20  -TM      Time 7  --  -TM      Additional Comments  yellow  -TM         Exercise 8    Exercise Name 8  seated PN with ball press  -TM      Sets 8  2  -TM      Reps 8  10  -TM         Exercise 9    Exercise Name 9  seated PN with T Band rows  -TM      Sets 9  2  -TM      Reps 9  10  -TM      Additional Comments  green  -TM         Exercise 10 "    Exercise Name 10  seated PN march  -TM      Sets 10  2  -TM      Reps 10  10  -TM        User Key  (r) = Recorded By, (t) = Taken By, (c) = Cosigned By    Initials Name Provider Type    Violte Nobles PTA Physical Therapy Assistant                      Manual Rx (last 36 hours)      Manual Treatments     Row Name 03/26/19 1100             Manual Rx 1    Manual Rx 1 Location  L innominate  -TM      Manual Rx 1 Type  MET for posterior torsion  -TM      Manual Rx 1 Duration  4 min  -TM        User Key  (r) = Recorded By, (t) = Taken By, (c) = Cosigned By    Initials Name Provider Type    Violet Nobles PTA Physical Therapy Assistant          PT OP Goals     Row Name 03/26/19 1000          PT Short Term Goals    STG Date to Achieve  03/13/19  -TM     STG 1  Patient will report lower back pain no greater than 5/10  -TM     STG 1 Progress  Ongoing  -TM     STG 2  Patient will demonstrate independence with HEP  -TM     STG 2 Progress  Ongoing;Progressing  -TM     STG 3  Pt will have 5/5 MMT of hip flexors  -TM     STG 3 Progress  Ongoing  -TM        Long Term Goals    LTG Date to Achieve  04/25/19  -TM     LTG 1  Patient will report pain no greater than 3/10  -TM     LTG 1 Progress  Not Met  -TM     LTG 2  Patient will demonstrate independence with home program for pain management.  -TM     LTG 2 Progress  Ongoing  -TM     LTG 3  Pt will present with level sacral base  -TM     LTG 3 Progress  Ongoing  -TM     LTG 4  Pt will present with no LLD  -TM     LTG 4 Progress  Ongoing  -TM       User Key  (r) = Recorded By, (t) = Taken By, (c) = Cosigned By    Initials Name Provider Type     Violet Omer PTA Physical Therapy Assistant                         Time Calculation:   Start Time: 1019  Stop Time: 1100  Time Calculation (min): 41 min  Total Timed Code Minutes- PT: 41 minute(s)  Therapy Charges for Today     Code Description Service Date Service Provider Modifiers Qty    86498177939  PT THER  PROC EA 15 MIN 3/26/2019 Violet Omer, PTA GP 3                    Violet Omer, PTA  3/26/2019

## 2019-03-28 ENCOUNTER — HOSPITAL ENCOUNTER (OUTPATIENT)
Dept: PHYSICAL THERAPY | Facility: HOSPITAL | Age: 54
Setting detail: THERAPIES SERIES
Discharge: HOME | End: 2019-03-28

## 2019-03-28 DIAGNOSIS — M54.50 PAIN, LUMBAR REGION: Primary | ICD-10-CM

## 2019-03-28 PROCEDURE — 97110 THERAPEUTIC EXERCISES: CPT

## 2019-03-28 PROCEDURE — G0283 ELEC STIM OTHER THAN WOUND: HCPCS

## 2019-03-28 NOTE — PROGRESS NOTES
Outpatient Physical Therapy Ortho Treatment Note   [facility]     Patient Name: Lisa Gardner  : 1965  MRN: [id number]  Today's Date: 3/28/2019      Visit Date: 2019    Subjective Improvement:     none  Attendance:   Approved:      15 visits per secondary     MD follow up:     19       date:    3-20-19    Visit Dx:    ICD-10-CM ICD-9-CM   1. Pain, lumbar region M54.5 724.2       Patient Active Problem List   Diagnosis   • Essential hypertension   • Precordial pain   • Severe episode of recurrent major depressive disorder, without psychotic features (CMS/HCC)   • Social anxiety disorder   • Suicidal overdose (CMS/HCC)        Past Medical History:   Diagnosis Date   • Alcohol abuse    • Anxiety    • Depression    • Hypertension    • Menopause    • Substance abuse    • Suicide attempt         Past Surgical History:   Procedure Laterality Date   • BLADDER SURGERY      bladder stretched as a child   • TONSILLECTOMY     • TUBAL ABDOMINAL LIGATION         PT Ortho     Row Name 19 1300       Subjective Pain    Able to rate subjective pain?  yes  -TM       Posture/Observations    Posture/Observations Comments  L posterior torsion & outflare  -TM    Row Name 19 1000       Subjective Comments    Subjective Comments  Reports waking up Sat morning with excruiating pain in back & down L LE.  Took pain med & muscle relaxor to get some relief.  Still has back pain, but not down the leg today.   -TM       Subjective Pain    Able to rate subjective pain?  yes  -TM    Pre-Treatment Pain Level  8  -TM    Post-Treatment Pain Level  8  -TM       Posture/Observations    Posture/Observations Comments  L posteriorly rotated innominate  -TM      User Key  (r) = Recorded By, (t) = Taken By, (c) = Cosigned By    Initials Name Provider Type    TM Violet Omer, PTA Physical Therapy Assistant                      PT Assessment/Plan     Row Name 19 1300          PT  "Assessment    Assessment Comments  Presented with malalignment that did not fully correct with MET.  Is very tender at L ASIS & PSIS.    -TM     Patient/caregiver participated in establishment of treatment plan and goals  Yes  -TM        PT Plan    PT Frequency  2x/week  -TM     Predicted Duration of Therapy Intervention (Therapy Eval)  2 weeks  -TM     PT Plan Comments  Progress to physioball for seated core as tolerated.  -TM       User Key  (r) = Recorded By, (t) = Taken By, (c) = Cosigned By    Initials Name Provider Type     Violet Omer PTA Physical Therapy Assistant          Modalities     Row Name 03/28/19 1300             Moist Heat    MH Applied  Yes  -TM      Location  Applied to back in conjunction with Estim  -TM      Rx Minutes  15 mins  -TM      MH S/P Rx  Yes  -TM         ELECTRICAL STIMULATION    Attended/Unattended  Unattended  -TM      Stimulation Type  IFC  -TM      Location/Electrode Placement/Other  low back/SI  -TM       PT E-Stim Unattended (Manual) Minutes  15  -TM        User Key  (r) = Recorded By, (t) = Taken By, (c) = Cosigned By    Initials Name Provider Type     Violet Omer PTA Physical Therapy Assistant        Exercises     Row Name 03/28/19 1300             Subjective Comments    Subjective Comments  Reports pain in low back & L SI area.  Had recent X-ray of L hip, but hasn't heard from results yet.   -TM         Subjective Pain    Able to rate subjective pain?  yes  -TM      Pre-Treatment Pain Level  8  -TM         Exercise 1    Exercise Name 1  PRO II ROM/endurance  -TM      Time 1  8 min  -TM      Additional Comments  L 3  -TM         Exercise 2    Exercise Name 2  seated HS stretch  -TM      Sets 2  2  -TM      Time 2  30\"  -TM         Exercise 3    Exercise Name 3  supine piriformis stretch  -TM      Sets 3  2  -TM      Time 3  30\"  -TM         Exercise 4    Exercise Name 4  assess alignment/MET  -TM      Time 4  6 min  -TM         Exercise 5    " Exercise Name 5  trans abs with hip Add   -TM      Sets 5  2  -TM      Reps 5  10  -TM         Exercise 6    Exercise Name 6  clarita disc seated PN T Band rows  -TM      Sets 6  2  -TM      Reps 6  10  -TM      Additional Comments  green  -TM         Exercise 7    Exercise Name 7  clarita disc seated PN T Band ext  -TM      Sets 7  2  -TM      Reps 7  10  -TM      Additional Comments  green  -TM         Exercise 8    Exercise Name 8  clarita disc seated PN LAQ  -TM      Sets 8  2  -TM      Reps 8  10  -TM         Exercise 9    Exercise Name 9  clarita disc seated PN march  -TM      Sets 9  2  -TM      Reps 9  10  -TM        User Key  (r) = Recorded By, (t) = Taken By, (c) = Cosigned By    Initials Name Provider Type    Violet Nobles PTA Physical Therapy Assistant                      Manual Rx (last 36 hours)      Manual Treatments     Row Name 03/28/19 1300             Manual Rx 1    Manual Rx 1 Location  L innominate  -TM      Manual Rx 1 Type  MET for posterior torsion & outflare  -TM      Manual Rx 1 Duration  6 min  -TM        User Key  (r) = Recorded By, (t) = Taken By, (c) = Cosigned By    Initials Name Provider Type    TM Violet Omer PTA Physical Therapy Assistant          PT OP Goals     Row Name 03/28/19 1300          PT Short Term Goals    STG Date to Achieve  03/13/19  -TM     STG 1  Patient will report lower back pain no greater than 5/10  -TM     STG 1 Progress  Ongoing  -TM     STG 2  Patient will demonstrate independence with HEP  -TM     STG 2 Progress  Ongoing;Progressing  -TM     STG 3  Pt will have 5/5 MMT of hip flexors  -TM     STG 3 Progress  Ongoing  -TM        Long Term Goals    LTG Date to Achieve  04/25/19  -TM     LTG 1  Patient will report pain no greater than 3/10  -TM     LTG 1 Progress  Not Met  -TM     LTG 2  Patient will demonstrate independence with home program for pain management.  -TM     LTG 2 Progress  Ongoing  -TM     LTG 3  Pt will present with level sacral base  -TM      LTG 3 Progress  Met  -TM     LTG 4  Pt will present with no LLD  -TM     LTG 4 Progress  Ongoing  -TM       User Key  (r) = Recorded By, (t) = Taken By, (c) = Cosigned By    Initials Name Provider Type    TM Violet Omer, REINA Physical Therapy Assistant                         Time Calculation:   Start Time: 1300  Stop Time: 1400  Time Calculation (min): 60 min  Total Timed Code Minutes- PT: 45 minute(s)  Therapy Charges for Today     Code Description Service Date Service Provider Modifiers Qty    37334804253 HC PT THER PROC EA 15 MIN 3/28/2019 Violet Omer, PTA GP 3    20262572164 HC PT ELECTRICAL STIM UNATTENDED 3/28/2019 Violet Omer, PTA  1                    Violet Omer PTA  3/28/2019

## 2019-04-02 ENCOUNTER — HOSPITAL ENCOUNTER (OUTPATIENT)
Dept: PHYSICAL THERAPY | Facility: HOSPITAL | Age: 54
Setting detail: THERAPIES SERIES
Discharge: HOME | End: 2019-04-02

## 2019-04-02 DIAGNOSIS — M54.50 PAIN, LUMBAR REGION: Primary | ICD-10-CM

## 2019-04-02 PROCEDURE — 97110 THERAPEUTIC EXERCISES: CPT

## 2019-04-02 PROCEDURE — G0283 ELEC STIM OTHER THAN WOUND: HCPCS

## 2019-04-02 NOTE — PROGRESS NOTES
Outpatient Physical Therapy Ortho Treatment Note   Surjit Galindo     Patient Name: Lisa Gardner  : 1965  MRN: [id number]  Today's Date: 2019      Visit Date: 2019    Subjective Improvement:   none    Attendance:   Approved:     15 visits per secondary      MD follow up:  19          date:     3-20-19    Visit Dx:    ICD-10-CM ICD-9-CM   1. Pain, lumbar region M54.5 724.2       Patient Active Problem List   Diagnosis   • Essential hypertension   • Precordial pain   • Severe episode of recurrent major depressive disorder, without psychotic features (CMS/HCC)   • Social anxiety disorder   • Suicidal overdose (CMS/HCC)        Past Medical History:   Diagnosis Date   • Alcohol abuse    • Anxiety    • Depression    • Hypertension    • Menopause    • Substance abuse    • Suicide attempt         Past Surgical History:   Procedure Laterality Date   • BLADDER SURGERY      bladder stretched as a child   • TONSILLECTOMY     • TUBAL ABDOMINAL LIGATION         PT Ortho     Row Name 19 0900       Subjective Comments    Subjective Comments  Reports severe pain across low back & into L hip.   -TM       Subjective Pain    Able to rate subjective pain?  yes  -TM    Pre-Treatment Pain Level  9  -TM    Post-Treatment Pain Level  8  -TM       Posture/Observations    Posture/Observations Comments  L posterior torsion innominate  -TM      User Key  (r) = Recorded By, (t) = Taken By, (c) = Cosigned By    Initials Name Provider Type    Violet Nobles, PTA Physical Therapy Assistant                      PT Assessment/Plan     Row Name 19 0900          PT Assessment    Assessment Comments  Pt able to maintain PN position with seated clarita disc activities.  Very little decrease in pain post RX.    -TM     Patient/caregiver participated in establishment of treatment plan and goals  Yes  -TM        PT Plan    PT Frequency  2x/week  -TM     Predicted Duration of Therapy  "Intervention (Therapy Eval)  2 weeks  -TM     PT Plan Comments  MD note for 4-08 appt.  Posssible D/C.   -TM       User Key  (r) = Recorded By, (t) = Taken By, (c) = Cosigned By    Initials Name Provider Type    TM Violet Omer PTA Physical Therapy Assistant          Modalities     Row Name 04/02/19 0900             Moist Heat    MH Applied  No  -TM      Location  Applied to back in conjunction with Estim  -TM      Rx Minutes  15 mins  -TM      MH S/P Rx  Yes  -TM         ELECTRICAL STIMULATION    Attended/Unattended  Unattended  -TM      Stimulation Type  IFC  -TM      Location/Electrode Placement/Other  low back/SI  -TM       PT E-Stim Unattended (Manual) Minutes  15  -TM        User Key  (r) = Recorded By, (t) = Taken By, (c) = Cosigned By    Initials Name Provider Type    TM Violet Omer PTA Physical Therapy Assistant        Exercises     Row Name 04/02/19 0900             Subjective Comments    Subjective Comments  Reports severe pain across low back & into L hip.   -TM         Subjective Pain    Able to rate subjective pain?  yes  -TM      Pre-Treatment Pain Level  9  -TM      Post-Treatment Pain Level  8  -TM         Exercise 1    Exercise Name 1  PRO II ROM/endurance  -TM      Time 1  8 min  -TM      Additional Comments  L 3  -TM         Exercise 2    Exercise Name 2  seated HS stretch  -TM      Sets 2  2  -TM      Time 2  30\"  -TM         Exercise 3    Exercise Name 3  supine piriformis stretch  -TM      Sets 3  2  -TM      Time 3  30\"  -TM         Exercise 4    Exercise Name 4  assess alignment/MET  -TM      Time 4  6 min  -TM         Exercise 5    Exercise Name 5  trans abs with hip Add   -TM      Sets 5  2  -TM      Reps 5  10  -TM         Exercise 6    Exercise Name 6  clarita disc seated PN T Band rows  -TM      Sets 6  2  -TM      Reps 6  10  -TM      Additional Comments  green  -TM         Exercise 7    Exercise Name 7  clarita disc seated PN T Band ext  -TM      Sets 7  2  -TM      " "Reps 7  10  -TM      Additional Comments  green  -TM         Exercise 8    Exercise Name 8  clarita disc seated PN LAQ  -TM      Sets 8  2  -TM      Reps 8  10  -TM         Exercise 9    Exercise Name 9  clarita disc seated PN march  -TM      Sets 9  2  -TM      Reps 9  10  -TM         Exercise 10    Exercise Name 10  sit <-> stands  -TM      Reps 10  10  -TM      Additional Comments  21\" seat  -TM        User Key  (r) = Recorded By, (t) = Taken By, (c) = Cosigned By    Initials Name Provider Type     Violet Omer PTA Physical Therapy Assistant                       PT OP Goals     Row Name 04/02/19 0900          PT Short Term Goals    STG Date to Achieve  03/13/19  -TM     STG 1  Patient will report lower back pain no greater than 5/10  -TM     STG 1 Progress  Ongoing  -TM     STG 2  Patient will demonstrate independence with HEP  -TM     STG 2 Progress  Ongoing;Progressing  -TM     STG 3  Pt will have 5/5 MMT of hip flexors  -TM     STG 3 Progress  Ongoing  -TM        Long Term Goals    LTG Date to Achieve  04/25/19  -TM     LTG 1  Patient will report pain no greater than 3/10  -TM     LTG 1 Progress  Not Met  -TM     LTG 2  Patient will demonstrate independence with home program for pain management.  -TM     LTG 2 Progress  Ongoing  -TM     LTG 3  Pt will present with level sacral base  -TM     LTG 3 Progress  Met  -TM     LTG 4  Pt will present with no LLD  -TM     LTG 4 Progress  Ongoing  -TM       User Key  (r) = Recorded By, (t) = Taken By, (c) = Cosigned By    Initials Name Provider Type     Violet Omer PTA Physical Therapy Assistant                         Time Calculation:   Start Time: 0922  Stop Time: 1017  Time Calculation (min): 55 min  Total Timed Code Minutes- PT: 40 minute(s)  Therapy Charges for Today     Code Description Service Date Service Provider Modifiers Qty    38203325999 HC PT THER PROC EA 15 MIN 4/2/2019 Violet Omer PTA GP 3    36728801302 HC PT ELECTRICAL STIM " UNATTENDED 4/2/2019 Violet Omer, PTA  1                    Violet Omer, PTA  4/2/2019

## 2019-04-04 ENCOUNTER — APPOINTMENT (OUTPATIENT)
Dept: PHYSICAL THERAPY | Facility: HOSPITAL | Age: 54
End: 2019-04-04

## 2019-04-23 ENCOUNTER — DOCUMENTATION (OUTPATIENT)
Dept: PHYSICAL THERAPY | Facility: HOSPITAL | Age: 54
End: 2019-04-23

## 2019-09-03 DIAGNOSIS — Z12.39 SCREENING BREAST EXAMINATION: Primary | ICD-10-CM

## 2019-10-29 ENCOUNTER — HOSPITAL ENCOUNTER (OUTPATIENT)
Dept: GENERAL RADIOLOGY | Facility: HOSPITAL | Age: 54
Discharge: HOME | End: 2019-10-29
Admitting: PAIN MEDICINE

## 2019-10-29 ENCOUNTER — HOSPITAL ENCOUNTER (OUTPATIENT)
Dept: GENERAL RADIOLOGY | Facility: HOSPITAL | Age: 54
Discharge: HOME | End: 2019-10-29

## 2019-10-29 DIAGNOSIS — M54.50 LOW BACK PAIN, UNSPECIFIED BACK PAIN LATERALITY, UNSPECIFIED CHRONICITY, UNSPECIFIED WHETHER SCIATICA PRESENT: ICD-10-CM

## 2019-10-29 DIAGNOSIS — M25.552 LEFT HIP PAIN: ICD-10-CM

## 2019-10-29 PROCEDURE — 73502 X-RAY EXAM HIP UNI 2-3 VIEWS: CPT

## 2019-10-29 PROCEDURE — 72110 X-RAY EXAM L-2 SPINE 4/>VWS: CPT

## 2020-01-08 ENCOUNTER — OFFICE VISIT (OUTPATIENT)
Dept: OBSTETRICS AND GYNECOLOGY | Facility: CLINIC | Age: 55
End: 2020-01-08

## 2020-01-08 VITALS
HEIGHT: 67 IN | DIASTOLIC BLOOD PRESSURE: 72 MMHG | SYSTOLIC BLOOD PRESSURE: 100 MMHG | BODY MASS INDEX: 29.88 KG/M2 | WEIGHT: 190.4 LBS

## 2020-01-08 DIAGNOSIS — Z12.4 CERVICAL CANCER SCREENING: Primary | ICD-10-CM

## 2020-01-08 DIAGNOSIS — R23.2 HOT FLASHES: ICD-10-CM

## 2020-01-08 PROCEDURE — G0123 SCREEN CERV/VAG THIN LAYER: HCPCS | Performed by: OBSTETRICS & GYNECOLOGY

## 2020-01-08 PROCEDURE — G0101 CA SCREEN;PELVIC/BREAST EXAM: HCPCS | Performed by: OBSTETRICS & GYNECOLOGY

## 2020-01-08 PROCEDURE — 87624 HPV HI-RISK TYP POOLED RSLT: CPT | Performed by: OBSTETRICS & GYNECOLOGY

## 2020-01-08 RX ORDER — CETIRIZINE HYDROCHLORIDE 10 MG/1
TABLET ORAL
COMMUNITY
Start: 2019-09-16 | End: 2023-03-24 | Stop reason: HOSPADM

## 2020-01-08 RX ORDER — HYDROCODONE BITARTRATE AND ACETAMINOPHEN 7.5; 325 MG/1; MG/1
TABLET ORAL
COMMUNITY
Start: 2019-12-18

## 2020-01-08 RX ORDER — VENLAFAXINE 75 MG/1
75 TABLET ORAL 2 TIMES DAILY
Qty: 60 TABLET | Refills: 12 | Status: SHIPPED | OUTPATIENT
Start: 2020-01-08 | End: 2020-02-03 | Stop reason: SINTOL

## 2020-01-08 RX ORDER — TIZANIDINE 4 MG/1
2 TABLET ORAL EVERY 8 HOURS PRN
Refills: 1 | COMMUNITY
Start: 2019-10-17 | End: 2023-03-24 | Stop reason: HOSPADM

## 2020-01-08 RX ORDER — GRAPE SEED EXT/BIOFLAV,CITRUS 50MG-250MG
CAPSULE ORAL DAILY
COMMUNITY

## 2020-01-08 RX ORDER — HYDROXYZINE PAMOATE 50 MG/1
50 CAPSULE ORAL 3 TIMES DAILY PRN
COMMUNITY
Start: 2019-12-11 | End: 2023-03-24 | Stop reason: HOSPADM

## 2020-01-08 RX ORDER — ASPIRIN 81 MG/1
81 TABLET, CHEWABLE ORAL DAILY
COMMUNITY

## 2020-01-08 RX ORDER — FLUTICASONE PROPIONATE 50 MCG
SPRAY, SUSPENSION (ML) NASAL
COMMUNITY
Start: 2019-11-25

## 2020-01-09 NOTE — PROGRESS NOTES
Complaint here for Pap smear and hot flashes  SUBJECTIVE:   54 y.o. female for annual routine Pap and checkup.  She complains of hot flashes primarily at night but some during the day.  She has had these since going through menopause but they are continuing.  They sound very suggestive of menopausal hot flashes discussed options including hormonal treatment versus SSRI.  She has been on Prozac in the past but discontinued.  Discussed options  Current Outpatient Medications   Medication Sig Dispense Refill   • 5-Hydroxytryptophan 50 MG capsule Take  by mouth.     • aspirin 81 MG chewable tablet Chew 81 mg Daily.     • Black Cohosh 540 MG capsule Take  by mouth Daily.     • Calcium Carbonate (CALTRATE 600) 1500 (600 Ca) MG tablet Take 1 tablet by mouth Daily.     • cetirizine (zyrTEC) 10 MG tablet TAKE ONE TABLET BY MOUTH DAILY     • fluticasone (FLONASE) 50 MCG/ACT nasal spray      • gabapentin (NEURONTIN) 400 MG capsule Take 1 capsule by mouth 3 (Three) Times a Day. (Patient taking differently: Take 600 mg by mouth 3 (Three) Times a Day.) 90 capsule 0   • HYDROcodone-acetaminophen (NORCO) 7.5-325 MG per tablet hydrocodone 7.5 mg-acetaminophen 325 mg tablet     • hydrOXYzine pamoate (VISTARIL) 50 MG capsule      • lisinopril (PRINIVIL,ZESTRIL) 10 MG tablet 20 mg.     • metoprolol succinate XL (TOPROL-XL) 25 MG 24 hr tablet TAKE 1 TABLET BY MOUTH DAILY.     • omeprazole (priLOSEC) 40 MG capsule TAKE 1 CAPSULE BY MOUTH DAILY.     • tiZANidine (ZANAFLEX) 4 MG tablet TAKE ONE AND ONE HALF TABLET BY MOUTH THREE TIMES A DAY  1   • ARIPiprazole (ABILIFY) 5 MG tablet Take 1 tablet by mouth Daily. 30 tablet 0   • ibuprofen (ADVIL,MOTRIN) 600 MG tablet TAKE 1 TABLET BY MOUTH EVERY SIX HOURS WITH FOOD AS NEEDED FOR PAIN     • promethazine (PHENERGAN) 25 MG tablet Take 25 mg by mouth Every 6 (Six) Hours.     • venlafaxine (EFFEXOR) 75 MG tablet Take 1 tablet by mouth 2 (Two) Times a Day for 30 days. 60 tablet 12     No current  "facility-administered medications for this visit.      Allergies: Codeine; Demerol [meperidine]; Ibuprofen; Tramadol; and Latex   No LMP recorded. Patient is postmenopausal.    ROS:  Feeling well. No dyspnea or chest pain on exertion.  No abdominal pain, change in bowel habits, black or bloody stools.  No urinary tract symptoms. GYN ROS: she complains of hot flashes. No neurological complaints.    OBJECTIVE:   The patient appears well, alert, oriented x 3, in no distress.  /72   Ht 170.2 cm (67\")   Wt 86.4 kg (190 lb 6.4 oz)   BMI 29.82 kg/m²   ENT normal.  Neck supple. No adenopathy or thyromegaly. SOLITARIO. Lungs are clear, good air entry, no wheezes, rhonchi or rales. S1 and S2 normal, no murmurs, regular rate and rhythm. Abdomen soft without tenderness, guarding, mass or organomegaly. Extremities show no edema, normal peripheral pulses. Neurological is normal, no focal findings.    BREAST EXAM: breasts appear normal, no suspicious masses, no skin or nipple changes or axillary nodes, not examined    PELVIC EXAM: normal external genitalia, vulva, vagina, cervix, uterus and adnexa    ASSESSMENT:   well woman  Complaint of hot flashes    PLAN:   mammogram  pap smear  return annually or prn  We will give Effexor a try for hot flashes if this fails we will consider hormonal treatment  "

## 2020-01-13 LAB
GEN CATEG CVX/VAG CYTO-IMP: NORMAL
LAB AP CASE REPORT: NORMAL
LAB AP GYN ADDITIONAL INFORMATION: NORMAL
PATH INTERP SPEC-IMP: NORMAL
STAT OF ADQ CVX/VAG CYTO-IMP: NORMAL

## 2020-01-15 LAB — HPV I/H RISK 4 DNA CVX QL PROBE+SIG AMP: NEGATIVE

## 2020-02-03 ENCOUNTER — TELEPHONE (OUTPATIENT)
Dept: OBSTETRICS AND GYNECOLOGY | Facility: CLINIC | Age: 55
End: 2020-02-03

## 2020-02-03 RX ORDER — PAROXETINE HYDROCHLORIDE 20 MG/1
10 TABLET, FILM COATED ORAL EVERY MORNING
Qty: 30 TABLET | Refills: 6 | Status: SHIPPED | OUTPATIENT
Start: 2020-02-03

## 2020-02-03 NOTE — TELEPHONE ENCOUNTER
PATIENT STATES THE EFLEXOR WAS GIVING HER CHILLS SO SHE HAS STOP TAKING IT. AND WANTED SOMETHING CALLED INTO [facility]

## 2020-02-04 NOTE — TELEPHONE ENCOUNTER
"Talk to patient the Effexor was effective in hot flashes but gave \"cold chills\" will try Paxil  "

## 2023-03-21 ENCOUNTER — HOSPITAL ENCOUNTER (INPATIENT)
Facility: HOSPITAL | Age: 58
LOS: 2 days | Discharge: HOME | DRG: 897 | End: 2023-03-24
Attending: FAMILY MEDICINE | Admitting: INTERNAL MEDICINE
Payer: MEDICARE

## 2023-03-21 ENCOUNTER — APPOINTMENT (OUTPATIENT)
Dept: CT IMAGING | Facility: HOSPITAL | Age: 58
DRG: 897 | End: 2023-03-21
Payer: MEDICARE

## 2023-03-21 DIAGNOSIS — R44.3 HALLUCINATIONS: Primary | ICD-10-CM

## 2023-03-21 DIAGNOSIS — E83.42 HYPOMAGNESEMIA: ICD-10-CM

## 2023-03-21 DIAGNOSIS — R41.82 ALTERED MENTAL STATUS, UNSPECIFIED ALTERED MENTAL STATUS TYPE: ICD-10-CM

## 2023-03-21 DIAGNOSIS — R13.10 DYSPHAGIA, UNSPECIFIED TYPE: ICD-10-CM

## 2023-03-21 DIAGNOSIS — E87.6 HYPOKALEMIA: ICD-10-CM

## 2023-03-21 LAB
ALBUMIN SERPL-MCNC: 3.8 G/DL (ref 3.5–5.2)
ALBUMIN/GLOB SERPL: 1.4 G/DL
ALP SERPL-CCNC: 99 U/L (ref 39–117)
ALT SERPL W P-5'-P-CCNC: 55 U/L (ref 1–33)
AMPHET+METHAMPHET UR QL: NEGATIVE
AMPHETAMINES UR QL: NEGATIVE
ANION GAP SERPL CALCULATED.3IONS-SCNC: 9 MMOL/L (ref 5–15)
APAP SERPL-MCNC: <5 MCG/ML (ref 0–30)
AST SERPL-CCNC: 68 U/L (ref 1–32)
BACTERIA UR QL AUTO: ABNORMAL /HPF
BARBITURATES UR QL SCN: NEGATIVE
BASOPHILS # BLD AUTO: 0.09 10*3/MM3 (ref 0–0.2)
BASOPHILS # BLD AUTO: 0.09 10*3/MM3 (ref 0–0.2)
BASOPHILS NFR BLD AUTO: 1.6 % (ref 0–1.5)
BASOPHILS NFR BLD AUTO: 1.7 % (ref 0–1.5)
BENZODIAZ UR QL SCN: NEGATIVE
BILIRUB SERPL-MCNC: 0.3 MG/DL (ref 0–1.2)
BILIRUB UR QL STRIP: NEGATIVE
BUN SERPL-MCNC: 12 MG/DL (ref 6–20)
BUN/CREAT SERPL: 18.5 (ref 7–25)
BUPRENORPHINE SERPL-MCNC: NEGATIVE NG/ML
CALCIUM SPEC-SCNC: 9.1 MG/DL (ref 8.6–10.5)
CANNABINOIDS SERPL QL: NEGATIVE
CHLORIDE SERPL-SCNC: 104 MMOL/L (ref 98–107)
CK SERPL-CCNC: 270 U/L (ref 20–180)
CLARITY UR: CLEAR
CO2 SERPL-SCNC: 25 MMOL/L (ref 22–29)
COCAINE UR QL: NEGATIVE
COLOR UR: YELLOW
CREAT SERPL-MCNC: 0.65 MG/DL (ref 0.57–1)
DEPRECATED RDW RBC AUTO: 50.4 FL (ref 37–54)
DEPRECATED RDW RBC AUTO: 51.8 FL (ref 37–54)
EGFRCR SERPLBLD CKD-EPI 2021: 102.8 ML/MIN/1.73
EOSINOPHIL # BLD AUTO: 0.21 10*3/MM3 (ref 0–0.4)
EOSINOPHIL # BLD AUTO: 0.21 10*3/MM3 (ref 0–0.4)
EOSINOPHIL NFR BLD AUTO: 3.6 % (ref 0.3–6.2)
EOSINOPHIL NFR BLD AUTO: 4 % (ref 0.3–6.2)
ERYTHROCYTE [DISTWIDTH] IN BLOOD BY AUTOMATED COUNT: 14.4 % (ref 12.3–15.4)
ERYTHROCYTE [DISTWIDTH] IN BLOOD BY AUTOMATED COUNT: 14.6 % (ref 12.3–15.4)
ETHANOL BLD-MCNC: <10 MG/DL (ref 0–10)
ETHANOL UR QL: <0.01 %
FLUAV SUBTYP SPEC NAA+PROBE: NOT DETECTED
FLUBV RNA ISLT QL NAA+PROBE: NOT DETECTED
GLOBULIN UR ELPH-MCNC: 2.7 GM/DL
GLUCOSE SERPL-MCNC: 129 MG/DL (ref 65–99)
GLUCOSE UR STRIP-MCNC: NEGATIVE MG/DL
HCT VFR BLD AUTO: 32.8 % (ref 34–46.6)
HCT VFR BLD AUTO: 34.5 % (ref 34–46.6)
HGB BLD-MCNC: 11.5 G/DL (ref 12–15.9)
HGB BLD-MCNC: 11.9 G/DL (ref 12–15.9)
HGB UR QL STRIP.AUTO: NEGATIVE
HOLD SPECIMEN: NORMAL
HYALINE CASTS UR QL AUTO: ABNORMAL /LPF
IMM GRANULOCYTES # BLD AUTO: 0.01 10*3/MM3 (ref 0–0.05)
IMM GRANULOCYTES # BLD AUTO: 0.02 10*3/MM3 (ref 0–0.05)
IMM GRANULOCYTES NFR BLD AUTO: 0.2 % (ref 0–0.5)
IMM GRANULOCYTES NFR BLD AUTO: 0.3 % (ref 0–0.5)
KETONES UR QL STRIP: ABNORMAL
LEUKOCYTE ESTERASE UR QL STRIP.AUTO: ABNORMAL
LIPASE SERPL-CCNC: 78 U/L (ref 13–60)
LYMPHOCYTES # BLD AUTO: 2.26 10*3/MM3 (ref 0.7–3.1)
LYMPHOCYTES # BLD AUTO: 2.3 10*3/MM3 (ref 0.7–3.1)
LYMPHOCYTES NFR BLD AUTO: 39.7 % (ref 19.6–45.3)
LYMPHOCYTES NFR BLD AUTO: 43.3 % (ref 19.6–45.3)
MAGNESIUM SERPL-MCNC: 1.4 MG/DL (ref 1.6–2.6)
MCH RBC QN AUTO: 33.2 PG (ref 26.6–33)
MCH RBC QN AUTO: 33.9 PG (ref 26.6–33)
MCHC RBC AUTO-ENTMCNC: 34.5 G/DL (ref 31.5–35.7)
MCHC RBC AUTO-ENTMCNC: 35.1 G/DL (ref 31.5–35.7)
MCV RBC AUTO: 96.4 FL (ref 79–97)
MCV RBC AUTO: 96.8 FL (ref 79–97)
METHADONE UR QL SCN: NEGATIVE
MONOCYTES # BLD AUTO: 0.5 10*3/MM3 (ref 0.1–0.9)
MONOCYTES # BLD AUTO: 0.52 10*3/MM3 (ref 0.1–0.9)
MONOCYTES NFR BLD AUTO: 10 % (ref 5–12)
MONOCYTES NFR BLD AUTO: 8.6 % (ref 5–12)
NEUTROPHILS NFR BLD AUTO: 2.13 10*3/MM3 (ref 1.7–7)
NEUTROPHILS NFR BLD AUTO: 2.67 10*3/MM3 (ref 1.7–7)
NEUTROPHILS NFR BLD AUTO: 40.8 % (ref 42.7–76)
NEUTROPHILS NFR BLD AUTO: 46.2 % (ref 42.7–76)
NITRITE UR QL STRIP: NEGATIVE
NRBC BLD AUTO-RTO: 0 /100 WBC (ref 0–0.2)
NRBC BLD AUTO-RTO: 0 /100 WBC (ref 0–0.2)
OPIATES UR QL: POSITIVE
OXYCODONE UR QL SCN: NEGATIVE
PCP UR QL SCN: NEGATIVE
PH UR STRIP.AUTO: 5.5 [PH] (ref 5–9)
PLATELET # BLD AUTO: 137 10*3/MM3 (ref 140–450)
PLATELET # BLD AUTO: 155 10*3/MM3 (ref 140–450)
PMV BLD AUTO: 11.2 FL (ref 6–12)
PMV BLD AUTO: 11.4 FL (ref 6–12)
POTASSIUM SERPL-SCNC: 3.1 MMOL/L (ref 3.5–5.2)
PROPOXYPH UR QL: NEGATIVE
PROT SERPL-MCNC: 6.5 G/DL (ref 6–8.5)
PROT UR QL STRIP: NEGATIVE
RBC # BLD AUTO: 3.39 10*6/MM3 (ref 3.77–5.28)
RBC # BLD AUTO: 3.58 10*6/MM3 (ref 3.77–5.28)
RBC # UR STRIP: ABNORMAL /HPF
REF LAB TEST METHOD: ABNORMAL
SALICYLATES SERPL-MCNC: <0.3 MG/DL
SARS-COV-2 RNA RESP QL NAA+PROBE: NOT DETECTED
SODIUM SERPL-SCNC: 138 MMOL/L (ref 136–145)
SP GR UR STRIP: 1.02 (ref 1–1.03)
SQUAMOUS #/AREA URNS HPF: ABNORMAL /HPF
TRICYCLICS UR QL SCN: NEGATIVE
UROBILINOGEN UR QL STRIP: ABNORMAL
WBC # UR STRIP: ABNORMAL /HPF
WBC NRBC COR # BLD: 5.22 10*3/MM3 (ref 3.4–10.8)
WBC NRBC COR # BLD: 5.79 10*3/MM3 (ref 3.4–10.8)
WHOLE BLOOD HOLD COAG: NORMAL

## 2023-03-21 PROCEDURE — 82550 ASSAY OF CK (CPK): CPT | Performed by: FAMILY MEDICINE

## 2023-03-21 PROCEDURE — 25010000002 PHENOBARBITAL PER 120 MG

## 2023-03-21 PROCEDURE — 80053 COMPREHEN METABOLIC PANEL: CPT | Performed by: FAMILY MEDICINE

## 2023-03-21 PROCEDURE — 80306 DRUG TEST PRSMV INSTRMNT: CPT | Performed by: FAMILY MEDICINE

## 2023-03-21 PROCEDURE — 82077 ASSAY SPEC XCP UR&BREATH IA: CPT | Performed by: FAMILY MEDICINE

## 2023-03-21 PROCEDURE — 36415 COLL VENOUS BLD VENIPUNCTURE: CPT | Performed by: FAMILY MEDICINE

## 2023-03-21 PROCEDURE — 83690 ASSAY OF LIPASE: CPT | Performed by: FAMILY MEDICINE

## 2023-03-21 PROCEDURE — G0378 HOSPITAL OBSERVATION PER HR: HCPCS

## 2023-03-21 PROCEDURE — 87636 SARSCOV2 & INF A&B AMP PRB: CPT | Performed by: FAMILY MEDICINE

## 2023-03-21 PROCEDURE — 80143 DRUG ASSAY ACETAMINOPHEN: CPT | Performed by: FAMILY MEDICINE

## 2023-03-21 PROCEDURE — 81001 URINALYSIS AUTO W/SCOPE: CPT | Performed by: FAMILY MEDICINE

## 2023-03-21 PROCEDURE — 70450 CT HEAD/BRAIN W/O DYE: CPT

## 2023-03-21 PROCEDURE — 25010000002 DIAZEPAM PER 5 MG: Performed by: HOSPITALIST

## 2023-03-21 PROCEDURE — 25010000002 THIAMINE PER 100 MG: Performed by: HOSPITALIST

## 2023-03-21 PROCEDURE — 0 POTASSIUM CHLORIDE 10 MEQ/100ML SOLUTION: Performed by: FAMILY MEDICINE

## 2023-03-21 PROCEDURE — 80179 DRUG ASSAY SALICYLATE: CPT | Performed by: FAMILY MEDICINE

## 2023-03-21 PROCEDURE — 85025 COMPLETE CBC W/AUTO DIFF WBC: CPT | Performed by: FAMILY MEDICINE

## 2023-03-21 PROCEDURE — 83735 ASSAY OF MAGNESIUM: CPT | Performed by: FAMILY MEDICINE

## 2023-03-21 PROCEDURE — 99285 EMERGENCY DEPT VISIT HI MDM: CPT

## 2023-03-21 PROCEDURE — 25010000002 HALOPERIDOL LACTATE PER 5 MG

## 2023-03-21 PROCEDURE — 25010000002 LORAZEPAM PER 2 MG: Performed by: FAMILY MEDICINE

## 2023-03-21 PROCEDURE — 25010000002 MAGNESIUM SULFATE 2 GM/50ML SOLUTION: Performed by: FAMILY MEDICINE

## 2023-03-21 RX ORDER — POTASSIUM CHLORIDE 7.45 MG/ML
10 INJECTION INTRAVENOUS ONCE
Status: COMPLETED | OUTPATIENT
Start: 2023-03-21 | End: 2023-03-21

## 2023-03-21 RX ORDER — PHENOBARBITAL 32.4 MG/1
32.4 TABLET ORAL ONCE
Status: DISCONTINUED | OUTPATIENT
Start: 2023-03-24 | End: 2023-03-22

## 2023-03-21 RX ORDER — POTASSIUM CHLORIDE 750 MG/1
40 CAPSULE, EXTENDED RELEASE ORAL ONCE
Status: COMPLETED | OUTPATIENT
Start: 2023-03-21 | End: 2023-03-21

## 2023-03-21 RX ORDER — ASPIRIN 81 MG/1
81 TABLET, CHEWABLE ORAL DAILY
Status: DISCONTINUED | OUTPATIENT
Start: 2023-03-22 | End: 2023-03-24 | Stop reason: HOSPADM

## 2023-03-21 RX ORDER — DIAZEPAM 5 MG/ML
5 INJECTION, SOLUTION INTRAMUSCULAR; INTRAVENOUS EVERY 6 HOURS
Status: DISCONTINUED | OUTPATIENT
Start: 2023-03-21 | End: 2023-03-21

## 2023-03-21 RX ORDER — HALOPERIDOL 5 MG/ML
5 INJECTION INTRAMUSCULAR ONCE
Status: COMPLETED | OUTPATIENT
Start: 2023-03-22 | End: 2023-03-21

## 2023-03-21 RX ORDER — SODIUM CHLORIDE 0.9 % (FLUSH) 0.9 %
10 SYRINGE (ML) INJECTION EVERY 12 HOURS SCHEDULED
Status: DISCONTINUED | OUTPATIENT
Start: 2023-03-21 | End: 2023-03-24 | Stop reason: HOSPADM

## 2023-03-21 RX ORDER — HALOPERIDOL 5 MG/ML
5 INJECTION INTRAMUSCULAR EVERY 6 HOURS PRN
Status: DISCONTINUED | OUTPATIENT
Start: 2023-03-21 | End: 2023-03-24 | Stop reason: HOSPADM

## 2023-03-21 RX ORDER — ACETAMINOPHEN 325 MG/1
650 TABLET ORAL EVERY 4 HOURS PRN
Status: DISCONTINUED | OUTPATIENT
Start: 2023-03-21 | End: 2023-03-24 | Stop reason: HOSPADM

## 2023-03-21 RX ORDER — DIAZEPAM 5 MG/ML
5 INJECTION, SOLUTION INTRAMUSCULAR; INTRAVENOUS EVERY 8 HOURS
Status: DISCONTINUED | OUTPATIENT
Start: 2023-03-22 | End: 2023-03-21

## 2023-03-21 RX ORDER — ONDANSETRON 4 MG/1
4 TABLET, FILM COATED ORAL EVERY 6 HOURS PRN
Status: DISCONTINUED | OUTPATIENT
Start: 2023-03-21 | End: 2023-03-24 | Stop reason: HOSPADM

## 2023-03-21 RX ORDER — NICOTINE 21 MG/24HR
1 PATCH, TRANSDERMAL 24 HOURS TRANSDERMAL EVERY 24 HOURS
Status: DISCONTINUED | OUTPATIENT
Start: 2023-03-21 | End: 2023-03-24 | Stop reason: HOSPADM

## 2023-03-21 RX ORDER — ENOXAPARIN SODIUM 100 MG/ML
40 INJECTION SUBCUTANEOUS DAILY
Status: DISCONTINUED | OUTPATIENT
Start: 2023-03-21 | End: 2023-03-24 | Stop reason: HOSPADM

## 2023-03-21 RX ORDER — SODIUM CHLORIDE 9 MG/ML
40 INJECTION, SOLUTION INTRAVENOUS AS NEEDED
Status: DISCONTINUED | OUTPATIENT
Start: 2023-03-21 | End: 2023-03-24 | Stop reason: HOSPADM

## 2023-03-21 RX ORDER — ONDANSETRON 2 MG/ML
4 INJECTION INTRAMUSCULAR; INTRAVENOUS EVERY 6 HOURS PRN
Status: DISCONTINUED | OUTPATIENT
Start: 2023-03-21 | End: 2023-03-24 | Stop reason: HOSPADM

## 2023-03-21 RX ORDER — MAGNESIUM SULFATE HEPTAHYDRATE 40 MG/ML
2 INJECTION, SOLUTION INTRAVENOUS ONCE
Status: COMPLETED | OUTPATIENT
Start: 2023-03-21 | End: 2023-03-21

## 2023-03-21 RX ORDER — DIAZEPAM 5 MG/ML
5 INJECTION, SOLUTION INTRAMUSCULAR; INTRAVENOUS EVERY 4 HOURS PRN
Status: DISCONTINUED | OUTPATIENT
Start: 2023-03-21 | End: 2023-03-24 | Stop reason: HOSPADM

## 2023-03-21 RX ORDER — PHENOBARBITAL 32.4 MG/1
32.4 TABLET ORAL ONCE
Status: DISCONTINUED | OUTPATIENT
Start: 2023-03-23 | End: 2023-03-22

## 2023-03-21 RX ORDER — SODIUM CHLORIDE 0.9 % (FLUSH) 0.9 %
10 SYRINGE (ML) INJECTION AS NEEDED
Status: DISCONTINUED | OUTPATIENT
Start: 2023-03-21 | End: 2023-03-24 | Stop reason: HOSPADM

## 2023-03-21 RX ORDER — PHENOBARBITAL SODIUM 65 MG/ML
178.1 INJECTION INTRAMUSCULAR ONCE
Status: COMPLETED | OUTPATIENT
Start: 2023-03-22 | End: 2023-03-22

## 2023-03-21 RX ORDER — PANTOPRAZOLE SODIUM 40 MG/1
40 TABLET, DELAYED RELEASE ORAL
Status: DISCONTINUED | OUTPATIENT
Start: 2023-03-22 | End: 2023-03-24 | Stop reason: HOSPADM

## 2023-03-21 RX ORDER — LORAZEPAM 2 MG/ML
1 INJECTION INTRAMUSCULAR ONCE
Status: COMPLETED | OUTPATIENT
Start: 2023-03-21 | End: 2023-03-21

## 2023-03-21 RX ORDER — IPRATROPIUM BROMIDE AND ALBUTEROL SULFATE 2.5; .5 MG/3ML; MG/3ML
3 SOLUTION RESPIRATORY (INHALATION) EVERY 6 HOURS PRN
Status: DISCONTINUED | OUTPATIENT
Start: 2023-03-21 | End: 2023-03-24 | Stop reason: HOSPADM

## 2023-03-21 RX ADMIN — POTASSIUM CHLORIDE 10 MEQ: 7.46 INJECTION, SOLUTION INTRAVENOUS at 17:28

## 2023-03-21 RX ADMIN — DIAZEPAM 5 MG: 5 INJECTION, SOLUTION INTRAMUSCULAR; INTRAVENOUS at 21:06

## 2023-03-21 RX ADMIN — THIAMINE HYDROCHLORIDE 100 ML/HR: 100 INJECTION, SOLUTION INTRAMUSCULAR; INTRAVENOUS at 22:09

## 2023-03-21 RX ADMIN — LORAZEPAM 1 MG: 2 INJECTION INTRAMUSCULAR; INTRAVENOUS at 18:23

## 2023-03-21 RX ADMIN — NICOTINE 1 PATCH: 21 PATCH, EXTENDED RELEASE TRANSDERMAL at 21:10

## 2023-03-21 RX ADMIN — Medication 10 ML: at 21:06

## 2023-03-21 RX ADMIN — POTASSIUM CHLORIDE 40 MEQ: 10 CAPSULE, COATED, EXTENDED RELEASE ORAL at 17:24

## 2023-03-21 RX ADMIN — HALOPERIDOL LACTATE 5 MG: 5 INJECTION, SOLUTION INTRAMUSCULAR at 23:06

## 2023-03-21 RX ADMIN — PHENOBARBITAL SODIUM 237.3 MG: 65 INJECTION INTRAMUSCULAR; INTRAVENOUS at 21:49

## 2023-03-21 RX ADMIN — MAGNESIUM SULFATE HEPTAHYDRATE 2 G: 40 INJECTION, SOLUTION INTRAVENOUS at 17:28

## 2023-03-21 NOTE — ED NOTES
"Patient sister at beside reports patient has been having \"drinking issues and not the same\" since October. Sister reports the patients  passed in October. On arrival patient reported to this RN her  was waiting in the lobby but did not want him to visit at this time.   "

## 2023-03-21 NOTE — Clinical Note
Level of Care: Telemetry [5]   Diagnosis: Hallucinations [780.1.ICD-9-CM]   Admitting Physician: PRIETO GONZALES [[id number]]   Attending Physician: PRIETO GONZALES [[id number]]

## 2023-03-21 NOTE — ED PROVIDER NOTES
Subjective   History of Present Illness  Patient presents to the emergency department with multiple complaints.  When asked what your biggest complaint is, she mentions low back pain which has been present for the past 10 years.  She then complains of cough and congestion x2 months.  Patient is a current smoker with a history of seasonal allergies.  She also states that she has been having visual hallucinations, and tremors that were initially worse last night, but have been present for the past 2 days.  Her last alcoholic drink was 5 days ago.      Hallucinations  Presenting symptoms: hallucinations    Presenting symptoms: no suicidal thoughts, no suicidal threats and no suicide attempt    Associated symptoms: no abdominal pain, no appetite change, no chest pain, no fatigue and no headaches        Review of Systems   Constitutional: Negative for appetite change, chills, diaphoresis, fatigue and fever.   HENT: Positive for congestion. Negative for ear discharge, ear pain, nosebleeds, rhinorrhea, sinus pressure, sore throat and trouble swallowing.    Eyes: Negative for discharge and redness.   Respiratory: Positive for cough. Negative for apnea, chest tightness, shortness of breath and wheezing.    Cardiovascular: Negative for chest pain.   Gastrointestinal: Negative for abdominal pain, diarrhea, nausea and vomiting.   Endocrine: Negative for polyuria.   Genitourinary: Negative for dysuria, frequency and urgency.   Musculoskeletal: Positive for back pain. Negative for myalgias and neck pain.   Skin: Negative for color change and rash.   Allergic/Immunologic: Negative for immunocompromised state.   Neurological: Positive for tremors. Negative for dizziness, seizures, syncope, weakness, light-headedness and headaches.   Hematological: Negative for adenopathy. Does not bruise/bleed easily.   Psychiatric/Behavioral: Positive for confusion and hallucinations. Negative for behavioral problems and suicidal ideas.   All  other systems reviewed and are negative.      Past Medical History:   Diagnosis Date   • Alcohol abuse    • Anxiety    • Depression    • Hypertension    • Menopause    • Substance abuse (HCC)    • Suicide attempt (HCC)        Allergies   Allergen Reactions   • Codeine Swelling   • Demerol [Meperidine]    • Ibuprofen GI Intolerance     Causes GI upset   • Tramadol    • Latex Rash       Past Surgical History:   Procedure Laterality Date   • BLADDER SURGERY      bladder stretched as a child   • TONSILLECTOMY     • TUBAL ABDOMINAL LIGATION         Family History   Problem Relation Age of Onset   • Chronic infections Mother         became delirious--septic   • Diabetes Mother    • Bipolar disorder Mother    • Transient ischemic attack Father         [facility] [place]   • Alcohol abuse Father    • Seizures Father    • Dementia Father    • Cancer Maternal Grandmother    • Stroke Paternal Grandmother    • Diabetes Paternal Grandmother    • Heart disease Paternal Grandfather    • Lung cancer Maternal Uncle    • Lung cancer Maternal Aunt    • Drug abuse Sister         Accidental overdose       Social History     Socioeconomic History   • Marital status:      Spouse name: Randy   • Number of children: 1   • Years of education: 12   Tobacco Use   • Smoking status: Every Day     Packs/day: 0.50     Years: 36.00     Pack years: 18.00     Types: Cigarettes   • Smokeless tobacco: Never   Substance and Sexual Activity   • Alcohol use: No     Comment: Last use of alcohol 2006   • Drug use: No     Comment: Marijuana and amphetamines in high school . In high school took grandfather's valium.   • Sexual activity: Defer     Partners: Male           Objective   Physical Exam  Vitals and nursing note reviewed.   Constitutional:       Appearance: She is well-developed.   HENT:      Head: Normocephalic and atraumatic.      Nose: Nose normal.   Eyes:      General: No scleral icterus.        Right eye: No discharge.         Left  eye: No discharge.      Conjunctiva/sclera: Conjunctivae normal.      Pupils: Pupils are equal, round, and reactive to light.   Neck:      Trachea: No tracheal deviation.   Cardiovascular:      Rate and Rhythm: Normal rate and regular rhythm.      Heart sounds: Normal heart sounds. No murmur heard.  Pulmonary:      Effort: Pulmonary effort is normal. No respiratory distress.      Breath sounds: Normal breath sounds. No stridor. No wheezing or rales.   Abdominal:      General: Bowel sounds are normal. There is no distension.      Palpations: Abdomen is soft. There is no mass.      Tenderness: There is no abdominal tenderness. There is no guarding or rebound.   Musculoskeletal:      Cervical back: Normal range of motion and neck supple.   Skin:     General: Skin is warm and dry.      Findings: No erythema or rash.   Neurological:      Mental Status: She is alert and oriented to person, place, and time.      Motor: No tremor.      Coordination: Coordination normal.   Psychiatric:         Behavior: Behavior normal.         Thought Content: Thought content normal.         Procedures           ED Course             Labs Reviewed   COMPREHENSIVE METABOLIC PANEL - Abnormal; Notable for the following components:       Result Value    Glucose 129 (*)     Potassium 3.1 (*)     ALT (SGPT) 55 (*)     AST (SGOT) 68 (*)     All other components within normal limits    Narrative:     GFR Normal >60  Chronic Kidney Disease <60  Kidney Failure <15     URINALYSIS W/ CULTURE IF INDICATED - Abnormal; Notable for the following components:    Ketones, UA Trace (*)     Leuk Esterase, UA Trace (*)     All other components within normal limits    Narrative:     In absence of clinical symptoms, the presence of pyuria, bacteria, and/or nitrites on the urinalysis result does not correlate with infection.   CBC WITH AUTO DIFFERENTIAL - Abnormal; Notable for the following components:    RBC 3.58 (*)     Hemoglobin 11.9 (*)     MCH 33.2 (*)      Basophil % 1.6 (*)     All other components within normal limits   LIPASE - Abnormal; Notable for the following components:    Lipase 78 (*)     All other components within normal limits   URINE DRUG SCREEN - Abnormal; Notable for the following components:    Opiate Screen Positive (*)     All other components within normal limits    Narrative:     Cutoff For Drugs Screened:    Amphetamines               500 ng/ml  Barbiturates               200 ng/ml  Benzodiazepines            150 ng/ml  Cocaine                    150 ng/ml  Methadone                  200 ng/ml  Opiates                    100 ng/ml  Phencyclidine               25 ng/ml  THC                            50 ng/ml  Methamphetamine            500 ng/ml  Tricyclic Antidepressants  300 ng/ml  Oxycodone                  100 ng/ml  Propoxyphene               300 ng/ml  Buprenorphine               10 ng/ml    The normal value for all drugs tested is negative. This report includes unconfirmed screening results, with the cutoff values listed, to be used for medical treatment purposes only.  Unconfirmed results must not be used for non-medical purposes such as employment or legal testing.  Clinical consideration should be applied to any drug of abuse test, particularly when unconfirmed results are used.     CK - Abnormal; Notable for the following components:    Creatine Kinase 270 (*)     All other components within normal limits   MAGNESIUM - Abnormal; Notable for the following components:    Magnesium 1.4 (*)     All other components within normal limits   URINALYSIS, MICROSCOPIC ONLY - Abnormal; Notable for the following components:    RBC, UA 0-2 (*)     Squamous Epithelial Cells, UA 3-5 (*)     All other components within normal limits   COVID-19 AND FLU A/B, NP SWAB IN TRANSPORT MEDIA 8-12 HR TAT - Normal    Narrative:     Fact sheet for providers: https://www.fda.gov/media/566602/download    Fact sheet for patients:  https://www.fda.gov/media/975632/download    Test performed by PCR.   ACETAMINOPHEN LEVEL - Normal   SALICYLATE LEVEL - Normal   ETHANOL   CBC WITH AUTO DIFFERENTIAL   CBC AND DIFFERENTIAL    Narrative:     The following orders were created for panel order CBC & Differential.  Procedure                               Abnormality         Status                     ---------                               -----------         ------                     CBC Auto Differential[[id number]]         Abnormal            Final result                 Please view results for these tests on the individual orders.   EXTRA TUBES    Narrative:     The following orders were created for panel order Extra Tubes.  Procedure                               Abnormality         Status                     ---------                               -----------         ------                     Gold Top - SST[[id number]]                                    Final result               Light Blue Top[[id number]]                                    Final result                 Please view results for these tests on the individual orders.   GOLD TOP - SST   LIGHT BLUE TOP   CBC AND DIFFERENTIAL    Narrative:     The following orders were created for panel order CBC & Differential.  Procedure                               Abnormality         Status                     ---------                               -----------         ------                     CBC Auto Differential[[id number]]                                                         Please view results for these tests on the individual orders.       CT Head Without Contrast   Final Result   1.  No acute intracranial process.   2.  Scattered chronic microangiopathic changes.               Electronically signed by:  Travis Tavarez MD  3/21/2023 5:37 PM CDT   Workstation: [id number]                                          Medical Decision Making  Assumed care from Dr. Edwards 1830hrs. ED course/radiographic/lab  studies reviewed.  CT head: negative.  CBC/CMP: remarkable for hypomagnesemia/hypokalemia, replaced in ED.  Psych consult obtained.  D/w Dr. Mejia who requests admit Hospitalist service for further monitoring and planned psych admit once medical stability assured as concern for possible alcohol withdrawal hallucinosis.  D/w Dr. Gonzales, hospitalist, accepting pt for admit.    Altered mental status, unspecified altered mental status type: acute illness or injury  Hallucinations: chronic illness or injury  Hypokalemia: acute illness or injury  Hypomagnesemia: acute illness or injury  Amount and/or Complexity of Data Reviewed  Labs: ordered.  Radiology: ordered.      Risk  Prescription drug management.  Decision regarding hospitalization.          Final diagnoses:   Hallucinations   Hypomagnesemia   Hypokalemia   Altered mental status, unspecified altered mental status type       ED Disposition  ED Disposition     ED Disposition   Decision to Admit    Condition   --    Comment   Level of Care: Telemetry [5]   Admitting Physician: PRIETO GONZALES [[id number]]   Attending Physician: PRIETO GONZALES [[id number]]   Patient Class: Observation [104]               No follow-up provider specified.       Medication List      Changed    gabapentin 400 MG capsule  Commonly known as: NEURONTIN  Take 1 capsule by mouth 3 (Three) Times a Day.  What changed: how much to take             Lan Barbosa MD  03/21/23 1913

## 2023-03-21 NOTE — ED NOTES
"Patient was in hallway of ER. This tech came out of room 17 and saw the patient. This tech asked patient if she needed something and she said yes that she has been here since 9 this morning and he ready to go. This tech redirected patient to bed in her room and asked patient what time she thought it was. Patient replied with 9:30 pm. This tech informed patient it was around 4:30 pm. Patient told this tech I cant fool her with computers and said its 9:30 and showed tech her phone. Tech replied to patient her phone said 4:24 pm. Patient told tech to \"get out of here\". This tech said ok and said ill talk with the doctor about an update. This tech informed RN and Provider.  "

## 2023-03-21 NOTE — ED NOTES
Patient placed in yellow gown but requests to keep pants on at this time. MD bunn w patient keeping pants on and belongings at this time. Patient being cooperative. Sister and sitter at bedside. Denies SI or HI.

## 2023-03-21 NOTE — ED NOTES
Spoke with Daxa on U, Dr. Lopez requests patient to have ativan to help with hallucinations before eval. MD made aware.

## 2023-03-21 NOTE — ED NOTES
"Pt stopped this RN in the hallway asking where her toilet paper was. When asked if she brought it with her she stated yes. This RN directed pt to room, and asked when she had the toilet paper last. The pt stated she brought it in with her last night. This RN attempted to reorient pt reminding her that she just arrived not that long ago. Pt stated \"Well I don't know I am just out of my mind right now\"   "

## 2023-03-21 NOTE — ED NOTES
Patient approached this RN and CH, RN requesting security go home and get her Jose toilet paper. This RN explained security was unable to do that. BRO CORDOVA assisted pt back to room. MD made aware.

## 2023-03-21 NOTE — ED NOTES
[facility] came to speak with patient. After ativan administration patient is now disorganized to situation and has flight of ideas. Patient needs constant reorientation to situation. MD made aware.

## 2023-03-22 PROBLEM — F32.A DEPRESSION: Status: ACTIVE | Noted: 2023-03-22

## 2023-03-22 PROBLEM — F10.931 DELIRIUM TREMENS (HCC): Status: ACTIVE | Noted: 2023-03-22

## 2023-03-22 PROBLEM — F10.939 ALCOHOL WITHDRAWAL (HCC): Status: ACTIVE | Noted: 2023-03-22

## 2023-03-22 PROBLEM — F10.20 ALCOHOL USE DISORDER, SEVERE, DEPENDENCE (HCC): Status: ACTIVE | Noted: 2023-03-22

## 2023-03-22 LAB
ALBUMIN SERPL-MCNC: 3.2 G/DL (ref 3.5–5.2)
ALBUMIN/GLOB SERPL: 1.5 G/DL
ALP SERPL-CCNC: 80 U/L (ref 39–117)
ALT SERPL W P-5'-P-CCNC: 44 U/L (ref 1–33)
ANION GAP SERPL CALCULATED.3IONS-SCNC: 9 MMOL/L (ref 5–15)
AST SERPL-CCNC: 58 U/L (ref 1–32)
BASOPHILS # BLD AUTO: 0.07 10*3/MM3 (ref 0–0.2)
BASOPHILS NFR BLD AUTO: 1.6 % (ref 0–1.5)
BILIRUB SERPL-MCNC: 0.4 MG/DL (ref 0–1.2)
BUN SERPL-MCNC: 10 MG/DL (ref 6–20)
BUN/CREAT SERPL: 17.9 (ref 7–25)
CALCIUM SPEC-SCNC: 8.4 MG/DL (ref 8.6–10.5)
CHLORIDE SERPL-SCNC: 112 MMOL/L (ref 98–107)
CO2 SERPL-SCNC: 23 MMOL/L (ref 22–29)
CREAT SERPL-MCNC: 0.56 MG/DL (ref 0.57–1)
DEPRECATED RDW RBC AUTO: 50.8 FL (ref 37–54)
EGFRCR SERPLBLD CKD-EPI 2021: 106.6 ML/MIN/1.73
EOSINOPHIL # BLD AUTO: 0.18 10*3/MM3 (ref 0–0.4)
EOSINOPHIL NFR BLD AUTO: 4.1 % (ref 0.3–6.2)
ERYTHROCYTE [DISTWIDTH] IN BLOOD BY AUTOMATED COUNT: 14.4 % (ref 12.3–15.4)
GLOBULIN UR ELPH-MCNC: 2.2 GM/DL
GLUCOSE SERPL-MCNC: 111 MG/DL (ref 65–99)
HCT VFR BLD AUTO: 32.4 % (ref 34–46.6)
HGB BLD-MCNC: 11.3 G/DL (ref 12–15.9)
IMM GRANULOCYTES # BLD AUTO: 0.02 10*3/MM3 (ref 0–0.05)
IMM GRANULOCYTES NFR BLD AUTO: 0.5 % (ref 0–0.5)
LYMPHOCYTES # BLD AUTO: 1.64 10*3/MM3 (ref 0.7–3.1)
LYMPHOCYTES NFR BLD AUTO: 37.8 % (ref 19.6–45.3)
MAGNESIUM SERPL-MCNC: 2 MG/DL (ref 1.6–2.6)
MCH RBC QN AUTO: 33.6 PG (ref 26.6–33)
MCHC RBC AUTO-ENTMCNC: 34.9 G/DL (ref 31.5–35.7)
MCV RBC AUTO: 96.4 FL (ref 79–97)
MONOCYTES # BLD AUTO: 0.36 10*3/MM3 (ref 0.1–0.9)
MONOCYTES NFR BLD AUTO: 8.3 % (ref 5–12)
NEUTROPHILS NFR BLD AUTO: 2.07 10*3/MM3 (ref 1.7–7)
NEUTROPHILS NFR BLD AUTO: 47.7 % (ref 42.7–76)
NRBC BLD AUTO-RTO: 0 /100 WBC (ref 0–0.2)
PLATELET # BLD AUTO: 135 10*3/MM3 (ref 140–450)
PMV BLD AUTO: 11.9 FL (ref 6–12)
POTASSIUM SERPL-SCNC: 3.3 MMOL/L (ref 3.5–5.2)
PROT SERPL-MCNC: 5.4 G/DL (ref 6–8.5)
RBC # BLD AUTO: 3.36 10*6/MM3 (ref 3.77–5.28)
SODIUM SERPL-SCNC: 144 MMOL/L (ref 136–145)
TSH SERPL DL<=0.05 MIU/L-ACNC: 5.56 UIU/ML (ref 0.27–4.2)
WBC NRBC COR # BLD: 4.34 10*3/MM3 (ref 3.4–10.8)

## 2023-03-22 PROCEDURE — 92610 EVALUATE SWALLOWING FUNCTION: CPT | Performed by: SPEECH-LANGUAGE PATHOLOGIST

## 2023-03-22 PROCEDURE — 84443 ASSAY THYROID STIM HORMONE: CPT | Performed by: HOSPITALIST

## 2023-03-22 PROCEDURE — 80053 COMPREHEN METABOLIC PANEL: CPT | Performed by: HOSPITALIST

## 2023-03-22 PROCEDURE — 90792 PSYCH DIAG EVAL W/MED SRVCS: CPT | Performed by: PSYCHIATRY & NEUROLOGY

## 2023-03-22 PROCEDURE — 25010000002 DIAZEPAM PER 5 MG

## 2023-03-22 PROCEDURE — 25010000002 THIAMINE PER 100 MG: Performed by: PSYCHIATRY & NEUROLOGY

## 2023-03-22 PROCEDURE — 25010000002 PHENOBARBITAL PER 120 MG

## 2023-03-22 PROCEDURE — 85025 COMPLETE CBC W/AUTO DIFF WBC: CPT | Performed by: HOSPITALIST

## 2023-03-22 PROCEDURE — HZ2ZZZZ DETOXIFICATION SERVICES FOR SUBSTANCE ABUSE TREATMENT: ICD-10-PCS

## 2023-03-22 PROCEDURE — 25010000002 MAGNESIUM SULFATE 2 GM/50ML SOLUTION: Performed by: INTERNAL MEDICINE

## 2023-03-22 PROCEDURE — 25010000002 ENOXAPARIN PER 10 MG: Performed by: HOSPITALIST

## 2023-03-22 PROCEDURE — 25010000002 THIAMINE PER 100 MG: Performed by: HOSPITALIST

## 2023-03-22 PROCEDURE — 83735 ASSAY OF MAGNESIUM: CPT | Performed by: HOSPITALIST

## 2023-03-22 RX ORDER — PHENOBARBITAL 32.4 MG/1
32.4 TABLET ORAL ONCE
Status: COMPLETED | OUTPATIENT
Start: 2023-03-24 | End: 2023-03-24

## 2023-03-22 RX ORDER — ACAMPROSATE CALCIUM 333 MG/1
666 TABLET, DELAYED RELEASE ORAL 3 TIMES DAILY
Qty: 180 TABLET | Refills: 0 | Status: SHIPPED | OUTPATIENT
Start: 2023-03-22

## 2023-03-22 RX ORDER — POTASSIUM CHLORIDE 1.5 G/1.77G
40 POWDER, FOR SOLUTION ORAL AS NEEDED
Status: DISCONTINUED | OUTPATIENT
Start: 2023-03-22 | End: 2023-03-24 | Stop reason: HOSPADM

## 2023-03-22 RX ORDER — PHENOBARBITAL 32.4 MG/1
32.4 TABLET ORAL ONCE
Status: COMPLETED | OUTPATIENT
Start: 2023-03-23 | End: 2023-03-23

## 2023-03-22 RX ORDER — ASCORBIC ACID 500 MG
1000 TABLET ORAL
Status: DISCONTINUED | OUTPATIENT
Start: 2023-03-22 | End: 2023-03-24 | Stop reason: HOSPADM

## 2023-03-22 RX ORDER — FLUTICASONE PROPIONATE 50 MCG
2 SPRAY, SUSPENSION (ML) NASAL DAILY
Status: DISCONTINUED | OUTPATIENT
Start: 2023-03-22 | End: 2023-03-24 | Stop reason: HOSPADM

## 2023-03-22 RX ORDER — MAGNESIUM SULFATE HEPTAHYDRATE 40 MG/ML
2 INJECTION, SOLUTION INTRAVENOUS ONCE
Status: COMPLETED | OUTPATIENT
Start: 2023-03-22 | End: 2023-03-22

## 2023-03-22 RX ORDER — POTASSIUM CHLORIDE 750 MG/1
40 CAPSULE, EXTENDED RELEASE ORAL AS NEEDED
Status: DISCONTINUED | OUTPATIENT
Start: 2023-03-22 | End: 2023-03-24 | Stop reason: HOSPADM

## 2023-03-22 RX ORDER — LISINOPRIL 20 MG/1
20 TABLET ORAL
Status: DISCONTINUED | OUTPATIENT
Start: 2023-03-22 | End: 2023-03-24 | Stop reason: HOSPADM

## 2023-03-22 RX ORDER — DIPHENHYDRAMINE HYDROCHLORIDE 25 MG/1
200 CAPSULE ORAL DAILY
Status: DISCONTINUED | OUTPATIENT
Start: 2023-03-22 | End: 2023-03-24 | Stop reason: HOSPADM

## 2023-03-22 RX ORDER — PHENOBARBITAL 32.4 MG/1
32.4 TABLET ORAL ONCE
Status: DISCONTINUED | OUTPATIENT
Start: 2023-03-24 | End: 2023-03-24

## 2023-03-22 RX ORDER — LANOLIN ALCOHOL/MO/W.PET/CERES
1000 CREAM (GRAM) TOPICAL DAILY
Status: DISCONTINUED | OUTPATIENT
Start: 2023-03-22 | End: 2023-03-24 | Stop reason: HOSPADM

## 2023-03-22 RX ADMIN — ASPIRIN 81 MG: 81 TABLET, CHEWABLE ORAL at 09:26

## 2023-03-22 RX ADMIN — ENOXAPARIN SODIUM 40 MG: 40 INJECTION SUBCUTANEOUS at 21:14

## 2023-03-22 RX ADMIN — THIAMINE HYDROCHLORIDE 100 ML/HR: 100 INJECTION, SOLUTION INTRAMUSCULAR; INTRAVENOUS at 21:14

## 2023-03-22 RX ADMIN — LISINOPRIL 20 MG: 20 TABLET ORAL at 12:21

## 2023-03-22 RX ADMIN — Medication 10 ML: at 21:15

## 2023-03-22 RX ADMIN — MAGNESIUM SULFATE HEPTAHYDRATE 2 G: 40 INJECTION, SOLUTION INTRAVENOUS at 12:22

## 2023-03-22 RX ADMIN — POTASSIUM CHLORIDE 40 MEQ: 750 CAPSULE, EXTENDED RELEASE ORAL at 21:16

## 2023-03-22 RX ADMIN — NICOTINE 1 PATCH: 21 PATCH, EXTENDED RELEASE TRANSDERMAL at 21:14

## 2023-03-22 RX ADMIN — CYANOCOBALAMIN TAB 1000 MCG 1000 MCG: 1000 TAB at 12:21

## 2023-03-22 RX ADMIN — PHENOBARBITAL SODIUM 178.1 MG: 65 INJECTION INTRAMUSCULAR; INTRAVENOUS at 04:20

## 2023-03-22 RX ADMIN — ACETAMINOPHEN 650 MG: 325 TABLET ORAL at 12:38

## 2023-03-22 RX ADMIN — FLUTICASONE PROPIONATE 2 SPRAY: 50 SPRAY, METERED NASAL at 14:29

## 2023-03-22 RX ADMIN — PHENOBARBITAL SODIUM 178.1 MG: 65 INJECTION INTRAMUSCULAR; INTRAVENOUS at 01:00

## 2023-03-22 RX ADMIN — OFLOXACIN 50000 UNITS: 300 TABLET, COATED ORAL at 12:22

## 2023-03-22 RX ADMIN — Medication 250 MG: at 12:22

## 2023-03-22 RX ADMIN — THIAMINE HYDROCHLORIDE 500 MG: 100 INJECTION, SOLUTION INTRAMUSCULAR; INTRAVENOUS at 16:31

## 2023-03-22 RX ADMIN — DIAZEPAM 5 MG: 5 INJECTION, SOLUTION INTRAMUSCULAR; INTRAVENOUS at 01:11

## 2023-03-22 RX ADMIN — OXYCODONE HYDROCHLORIDE AND ACETAMINOPHEN 1000 MG: 500 TABLET ORAL at 12:21

## 2023-03-22 RX ADMIN — Medication 200 MG: at 12:22

## 2023-03-22 RX ADMIN — PHENOBARBITAL SODIUM 65 MG: 65 INJECTION INTRAMUSCULAR; INTRAVENOUS at 16:04

## 2023-03-22 RX ADMIN — OXYCODONE HYDROCHLORIDE AND ACETAMINOPHEN 1000 MG: 500 TABLET ORAL at 17:07

## 2023-03-22 RX ADMIN — THIAMINE HYDROCHLORIDE 500 MG: 100 INJECTION, SOLUTION INTRAMUSCULAR; INTRAVENOUS at 09:21

## 2023-03-22 RX ADMIN — POTASSIUM CHLORIDE 40 MEQ: 750 CAPSULE, EXTENDED RELEASE ORAL at 18:08
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
## 2023-03-22 NOTE — ED NOTES
Nursing report ED to floor  Lisa Gardner  57 y.o.  female    HPI:   Chief Complaint   Patient presents with    Hallucinations    Tremors       Admitting doctor:   William Martinez DO    Consulting provider(s):  Consults       Date and Time Order Name Status Description    3/21/2023  7:10 PM Hospitalist (on-call MD unless specified)      3/21/2023  7:10 PM Psychiatry (on-call MD unless specified)               Admitting diagnosis:   The primary encounter diagnosis was Hallucinations. Diagnoses of Hypomagnesemia, Hypokalemia, and Altered mental status, unspecified altered mental status type were also pertinent to this visit.    Code status:   Current Code Status       Date Active Code Status Order ID Comments User Context       Prior            Allergies:   Codeine, Demerol [meperidine], Ibuprofen, Tramadol, and Latex    Intake and Output  No intake or output data in the 24 hours ending 03/21/23 1934    Weight:       03/21/23  1449   Weight: 77.1 kg (170 lb)       Most recent vitals:   Vitals:    03/21/23 1729 03/21/23 1828 03/21/23 1834 03/21/23 1915   BP: 141/63 132/94  137/95   BP Location:       Patient Position:       Pulse: 70 64 70    Resp: 18 18     Temp:       TempSrc:       SpO2: 96% 96% 96%    Weight:       Height:         Oxygen Therapy: room air    Active LDAs/IV Access:   Lines, Drains & Airways       Active LDAs       Name Placement date Placement time Site Days    Peripheral IV 03/21/23 1728 Right Antecubital 03/21/23  1728  Antecubital  less than 1                    Labs (abnormal labs have a star):   Labs Reviewed   COMPREHENSIVE METABOLIC PANEL - Abnormal; Notable for the following components:       Result Value    Glucose 129 (*)     Potassium 3.1 (*)     ALT (SGPT) 55 (*)     AST (SGOT) 68 (*)     All other components within normal limits    Narrative:     GFR Normal >60  Chronic Kidney Disease <60  Kidney Failure <15     URINALYSIS W/ CULTURE IF INDICATED - Abnormal; Notable for  the following components:    Ketones, UA Trace (*)     Leuk Esterase, UA Trace (*)     All other components within normal limits    Narrative:     In absence of clinical symptoms, the presence of pyuria, bacteria, and/or nitrites on the urinalysis result does not correlate with infection.   CBC WITH AUTO DIFFERENTIAL - Abnormal; Notable for the following components:    RBC 3.58 (*)     Hemoglobin 11.9 (*)     MCH 33.2 (*)     Basophil % 1.6 (*)     All other components within normal limits   LIPASE - Abnormal; Notable for the following components:    Lipase 78 (*)     All other components within normal limits   URINE DRUG SCREEN - Abnormal; Notable for the following components:    Opiate Screen Positive (*)     All other components within normal limits    Narrative:     Cutoff For Drugs Screened:    Amphetamines               500 ng/ml  Barbiturates               200 ng/ml  Benzodiazepines            150 ng/ml  Cocaine                    150 ng/ml  Methadone                  200 ng/ml  Opiates                    100 ng/ml  Phencyclidine               25 ng/ml  THC                            50 ng/ml  Methamphetamine            500 ng/ml  Tricyclic Antidepressants  300 ng/ml  Oxycodone                  100 ng/ml  Propoxyphene               300 ng/ml  Buprenorphine               10 ng/ml    The normal value for all drugs tested is negative. This report includes unconfirmed screening results, with the cutoff values listed, to be used for medical treatment purposes only.  Unconfirmed results must not be used for non-medical purposes such as employment or legal testing.  Clinical consideration should be applied to any drug of abuse test, particularly when unconfirmed results are used.     CK - Abnormal; Notable for the following components:    Creatine Kinase 270 (*)     All other components within normal limits   MAGNESIUM - Abnormal; Notable for the following components:    Magnesium 1.4 (*)     All other components  within normal limits   URINALYSIS, MICROSCOPIC ONLY - Abnormal; Notable for the following components:    RBC, UA 0-2 (*)     Squamous Epithelial Cells, UA 3-5 (*)     All other components within normal limits   COVID-19 AND FLU A/B, NP SWAB IN TRANSPORT MEDIA 8-12 HR TAT - Normal    Narrative:     Fact sheet for providers: https://www.fda.gov/media/385844/download    Fact sheet for patients: https://www.fda.gov/media/260716/download    Test performed by PCR.   ACETAMINOPHEN LEVEL - Normal   SALICYLATE LEVEL - Normal   ETHANOL   CBC WITH AUTO DIFFERENTIAL   CBC AND DIFFERENTIAL    Narrative:     The following orders were created for panel order CBC & Differential.  Procedure                               Abnormality         Status                     ---------                               -----------         ------                     CBC Auto Differential[[id number]]         Abnormal            Final result                 Please view results for these tests on the individual orders.   EXTRA TUBES    Narrative:     The following orders were created for panel order Extra Tubes.  Procedure                               Abnormality         Status                     ---------                               -----------         ------                     Gold Top - SST[[id number]]                                    Final result               Light Blue Top[[id number]]                                    Final result                 Please view results for these tests on the individual orders.   GOLD TOP - SST   LIGHT BLUE TOP   CBC AND DIFFERENTIAL    Narrative:     The following orders were created for panel order CBC & Differential.  Procedure                               Abnormality         Status                     ---------                               -----------         ------                     CBC Auto Differential[[id number]]                                                         Please view results for these  tests on the individual orders.       Meds given in ED:   Medications   magnesium sulfate 2g/50 mL (PREMIX) infusion (0 g Intravenous Stopped 3/21/23 1847)   potassium chloride 10 mEq in 100 mL IVPB (0 mEq Intravenous Stopped 3/21/23 1847)   potassium chloride (MICRO-K) CR capsule 40 mEq (40 mEq Oral Given 3/21/23 1724)   LORazepam (ATIVAN) injection 1 mg (1 mg Intravenous Given 3/21/23 1823)     No current facility-administered medications for this encounter.       NIH Stroke Scale:       Isolation/Infection(s):  No active isolations   COVID (rule out)     COVID Testing  Collected yes  Resulted negative    Nursing report ED to floor:  Mental status: alert and disorieted x 4  Ambulatory status: 1 assist  Precautions: none    ED nurse phone extentsivc- [phone]

## 2023-03-22 NOTE — THERAPY DISCHARGE NOTE
Acute Care - Speech Language Pathology   Swallow Initial Evaluation/Discharge [facility]     Patient Name: Lisa Gardner  : 1965  MRN: [id number]  Today's Date: 3/22/2023               Admit Date: 3/21/2023     Pt seen for skilled ST services this date to assess swallow function.  Pt was admitted w/hallucinations d/t recently quiting drinking 4 days ago.  Pt has hx of alcohol abuse w/drink of choice being whiskey.  Pt is currently on a clear liquid diet w/no reports of difficulty.  Pt was resting, but easily aroused for safe PO intake.  Pt was able to safely self feed.  Pt consumed regular solids (crispy barrientos) and soft solids (biscuit w/gravy) w/efficient mastication, good oral clearance, and timely AP transit.  Pt consumed thin liquids via straw w/no overt s/s of aspiration.  Pt requests to be on chopped meats at this time, but is cleared for regular solids is she wishes to return to regular diet.  SLP recommends regular solids/thin liquids, but will advance diet to mechanical soft w/chopped meats per pt preference.  Skilled ST services not recommended at this time; pt educated on results and recommendations; pt in agreement.    Visit Dx:    ICD-10-CM ICD-9-CM   1. Hallucinations  R44.3 780.1   2. Hypomagnesemia  E83.42 275.2   3. Hypokalemia  E87.6 276.8   4. Altered mental status, unspecified altered mental status type  R41.82 780.97   5. Dysphagia, unspecified type  R13.10 787.20     Patient Active Problem List   Diagnosis   • Essential hypertension   • Precordial pain   • Severe episode of recurrent major depressive disorder, without psychotic features (HCC)   • Social anxiety disorder   • Suicidal overdose (HCC)   • Cervical cancer screening   • Hot flashes   • Hallucinations     Past Medical History:   Diagnosis Date   • Alcohol abuse    • Anxiety    • Depression    • Hypertension    • Menopause    • Substance abuse (HCC)    • Suicide attempt (HCC)      Past Surgical History:    Procedure Laterality Date   • BLADDER SURGERY      bladder stretched as a child   • TONSILLECTOMY     • TUBAL ABDOMINAL LIGATION         SLP Recommendation and Plan  SLP Swallowing Diagnosis: swallow WFL/no suspected pharyngeal impairment (03/22/23 0715)  SLP Diet Recommendation: regular textures, thin liquids (03/22/23 0715)     Monitor for Signs of Aspiration: notify SLP if any concerns (03/22/23 0715)  Recommended Diagnostics: No further SLP services recommended (03/22/23 0715)  Swallow Criteria for Skilled Therapeutic Interventions Met: no problems identified which require skilled intervention (03/22/23 0715)  Anticipated Discharge Disposition (SLP): unknown (03/22/23 0756)     Therapy Frequency (Swallow): evaluation only (03/22/23 0715)              Anticipated Discharge Disposition (SLP): unknown (03/22/23 0756)           Reason for Discharge: other (see comments) (Eval only) (03/22/23 0756)                Plan of Care Reviewed With: patient (03/22/23 0800)  Outcome Evaluation: Pt seen for skilled ST services this date to assess swallow function.  Pt was admitted w/hallucinations d/t recently quiting drinking 4 days ago.  Pt has hx of alcohol abuse w/drink of choice being whiskey.  Pt is currently on a clear liquid diet w/no reports of difficulty.  Pt was resting, but easily aroused for safe PO intake.  Pt was able to safely self feed.  Pt consumed regular solids (crispy barrientos) and soft solids (biscuit w/gravy) w/efficient mastication, good oral clearance, and timely AP transit.  Pt consumed thin liquids via straw w/no overt s/s of aspiration.  Pt requests to be on chopped meats at this time, but is cleared for regular solids is she wishes to return to regular diet.  SLP recommends regular solids/thin liquids, but will advance diet to mechanical soft w/chopped meats per pt preference.  Skilled ST services not recommended at this time; pt educated on results and recommendations; pt in agreement. (03/22/23  0800)    SWALLOW EVALUATION (last 72 hours)     SLP Adult Swallow Evaluation     Row Name 03/22/23 0715                   Rehab Evaluation    Document Type evaluation  -CK        Total Evaluation Minutes, SLP 44  -CK        Subjective Information no complaints  -CK        Patient Observations alert;cooperative;agree to therapy  -CK        Patient/Family/Caregiver Comments/Observations No family present at bedside  -CK        Patient Effort good  -CK           General Information    Patient Profile Reviewed yes  -CK        Pertinent History Of Current Problem Pt w/hx of alcohol abuse w/drink of choice being whiskey.  Drinking has apparently increased since 's death in Oct.  Pt quit drinking 4 days ago and presented to ED w/hallucinations.  Pt reports she has upper dentures at home and sometimes she eats wtih them and sometimes w/o them.  -CK        Current Method of Nutrition thin liquids;clear liquids  -CK        Precautions/Limitations, Vision WFL with corrective lenses  -CK        Precautions/Limitations, Hearing WFL  -CK        Prior Level of Function-Communication WFL  -CK        Prior Level of Function-Swallowing no diet consistency restrictions  -CK        Patient's Goals for Discharge return home  -CK           Pain    Additional Documentation Pain Scale: Numbers Pre/Post-Treatment (Group)  -CK           Pain Scale: Numbers Pre/Post-Treatment    Pretreatment Pain Rating 0/10 - no pain  -CK        Posttreatment Pain Rating 0/10 - no pain  -CK           Oral Motor Structure and Function    Dentition Assessment edentulous, dentures not available  -CK        Secretion Management WNL/WFL  -CK        Mucosal Quality moist, healthy  -CK        Volitional Swallow WFL  -CK        Volitional Cough WFL  -CK           General Eating/Swallowing Observations    Respiratory Support Currently in Use room air  -CK        Eating/Swallowing Skills self-fed;appropriate self-feeding skills observed  -CK        Positioning  During Eating upright 90 degree;upright in bed  -CK        Utensils Used straw  -CK        Consistencies Trialed regular textures;soft to chew textures;thin liquids  -CK           Clinical Swallow Eval    Oral Prep Phase WFL  -CK        Oral Transit WFL  -CK        Oral Residue WFL  -CK        Pharyngeal Phase no overt signs/symptoms of pharyngeal impairment  -CK        Esophageal Phase unremarkable  -CK        Clinical Swallow Evaluation Summary Pt seen for skilled ST services this date to assess swallow function.  Pt was admitted w/hallucinations d/t recently quiting drinking 4 days ago.  Pt has hx of alcohol abuse w/drink of choice being whiskey.  Pt is currently on a clear liquid diet w/no reports of difficulty.  Pt was resting, but easily aroused for safe PO intake.  Pt was able to safely self feed.  Pt consumed regular solids (crispy barrientos) and soft solids (biscuit w/gravy) w/efficient mastication, good oral clearance, and timely AP transit.  Pt consumed thin liquids via straw w/no overt s/s of aspiration.  Pt requests to be on chopped meats at this time, but is cleared for regular solids is she wishes to return to regular diet.  SLP recommends regular solids/thin liquids, but will advance diet to mechanical soft w/chopped meats per pt preference.  Skilled ST services not recommended at this time; pt educated on results and recommendations; pt in agreement.  -CK           SLP Evaluation Clinical Impression    SLP Swallowing Diagnosis swallow WFL/no suspected pharyngeal impairment  -CK        Functional Impact no impact on function  -CK        Swallow Criteria for Skilled Therapeutic Interventions Met no problems identified which require skilled intervention  -CK           SLP Treatment Clinical Impressions    Care Plan Review evaluation/treatment results reviewed;care plan/treatment goals reviewed;risks/benefits reviewed;current/potential barriers reviewed;patient/other agree to care plan  -CK            Recommendations    Therapy Frequency (Swallow) evaluation only  -CK        SLP Diet Recommendation regular textures;thin liquids  -CK        Recommended Diagnostics No further SLP services recommended  -CK        SLP Rec. for Method of Medication Administration as tolerated  -CK        Monitor for Signs of Aspiration notify SLP if any concerns  -CK        Anticipated Discharge Disposition (SLP) unknown  -CK              User Key  (r) = Recorded By, (t) = Taken By, (c) = Cosigned By    Initials Name Effective Dates    Danika Munoz MS CCC-SLP 06/16/21 -                 EDUCATION  The patient has been educated in the following areas:   Results and recommendations.                 Time Calculation:    Time Calculation- SLP     Row Name 03/22/23 0759             Time Calculation- SLP    SLP Start Time 0715  -CK      SLP Stop Time 0759  -CK      SLP Time Calculation (min) 44 min  -CK      Total Timed Code Minutes- SLP 44 minute(s)  -      SLP Received On 03/22/23  -CK         Untimed Charges    SLP Eval/Re-eval  ST Eval Oral Pharyng Swallow - [id number]  -CK      [id number] Eval Oral Pharyng Swallow Minutes 44  -CK         Total Minutes    Untimed Charges Total Minutes 44  -CK       Total Minutes 44  -CK            User Key  (r) = Recorded By, (t) = Taken By, (c) = Cosigned By    Initials Name Provider Type    Danika Munoz MS CCC-SLP Speech and Language Pathologist                Therapy Charges for Today     Code Description Service Date Service Provider Modifiers Qty    [id number]  ST EVAL ORAL PHARYNG SWALLOW 3 3/22/2023 Danika Soler MS CCC-SLP GN 1               SLP Discharge Summary  Anticipated Discharge Disposition (SLP): unknown  Reason for Discharge: other (see comments) (Eval only)    Danika Soler MS CCC-SLP  3/22/2023

## 2023-03-22 NOTE — PLAN OF CARE
Goal Outcome Evaluation:  Plan of Care Reviewed With: patient           Outcome Evaluation: Pt seen for skilled ST services this date to assess swallow function.  Pt was admitted w/hallucinations d/t recently quiting drinking 4 days ago.  Pt has hx of alcohol abuse w/drink of choice being whiskey.  Pt is currently on a clear liquid diet w/no reports of difficulty.  Pt was resting, but easily aroused for safe PO intake.  Pt was able to safely self feed.  Pt consumed regular solids (crispy barrientos) and soft solids (biscuit w/gravy) w/efficient mastication, good oral clearance, and timely AP transit.  Pt consumed thin liquids via straw w/no overt s/s of aspiration.  Pt requests to be on chopped meats at this time, but is cleared for regular solids is she wishes to return to regular diet.  SLP recommends regular solids/thin liquids, but will advance diet to mechanical soft w/chopped meats per pt preference.  Skilled ST services not recommended at this time; pt educated on results and recommendations; pt in agreement.   98

## 2023-03-22 NOTE — PLAN OF CARE
Goal Outcome Evaluation:    Patient in bed, restraints in place, sitter at bedside. Vital signs stable, no complaints of pain or discomfort, no s/s of distress

## 2023-03-22 NOTE — PLAN OF CARE
Goal Outcome Evaluation: Patient resting in bed throughout shift. VS stable. No distress noted. Restraints DC this am. Sitter DC this afternoon. No complaints of pain. Ciwa negative.

## 2023-03-22 NOTE — H&P
[facility] Medicine  HISTORY AND PHYSICAL      Date of Admission: 3/21/2023  Primary Care Physician: Jayy Johnson MD    Subjective     Chief Complaint:   Alcohol withdrawal hallucinations    History of Present Illness  Patient presents to the emergency department with multiple complaints.  When asked what your biggest complaint is, she mentions low back pain which has been present for the past 10 years.  She then complains of cough and congestion x2 months.  Patient is a current smoker with a history of seasonal allergies.  She also states that she has been having visual hallucinations, and tremors that were initially worse last night, but have been present for the past 2 days.  Her last alcoholic drink was 5 days ago.    Patient seen and examined 3/21/2023.'s  Patient has had unusual reaction to Ativan 6.  The patient's sister is at bedside said she has had this reaction in the past becomes very confused disoriented.  Patient was already having hallucinations what appeared to be alcohol withdrawal on evaluation in the ER once Ativan was given she became much worse.  On discussion with Dr. Mejia regarding the case he recommended to switch to Valium and watch for response to treatment.  Patient is oriented to self only unable to provide any information.  The case was discussed with the patient's sister limited information was given and is noted in the chart.        Review of Systems   Unable to perform ROS: Mental status change        Otherwise complete ROS reviewed and negative except as mentioned in the HPI.    Past Medical History:   Past Medical History:   Diagnosis Date   • Alcohol abuse    • Anxiety    • Depression    • Hypertension    • Menopause    • Substance abuse (HCC)    • Suicide attempt (HCC)      Past Surgical History:  Past Surgical History:   Procedure Laterality Date   • BLADDER SURGERY      bladder stretched as a child   • TONSILLECTOMY      • TUBAL ABDOMINAL LIGATION       Social History:  reports that she has been smoking cigarettes. She has a 18.00 pack-year smoking history. She has never used smokeless tobacco. She reports that she does not drink alcohol and does not use drugs.    Family History: family history includes Alcohol abuse in her father; Bipolar disorder in her mother; Cancer in her maternal grandmother; Chronic infections in her mother; Dementia in her father; Diabetes in her mother and paternal grandmother; Drug abuse in her sister; Heart disease in her paternal grandfather; Lung cancer in her maternal aunt and maternal uncle; Seizures in her father; Stroke in her paternal grandmother; Transient ischemic attack in her father.       Allergies:  Allergies   Allergen Reactions   • Codeine Swelling   • Demerol [Meperidine]    • Ibuprofen GI Intolerance     Causes GI upset   • Tramadol    • Latex Rash       Medications:  Prior to Admission medications    Medication Sig Start Date End Date Taking? Authorizing Provider   5-Hydroxytryptophan 50 MG capsule Take  by mouth.   Yes Mark Thompson MD   ARIPiprazole (ABILIFY) 5 MG tablet Take 1 tablet by mouth Daily. 2/9/17  Yes Lima Kaminski MD   aspirin 81 MG chewable tablet Chew 1 tablet Daily.   Yes Mark Thompson MD   Black Cohosh 540 MG capsule Take  by mouth Daily.   Yes Mark Thompson MD   Calcium Carbonate 1500 (600 Ca) MG tablet Take 1 tablet by mouth Daily.   Yes Mark Thompson MD   cetirizine (zyrTEC) 10 MG tablet TAKE ONE TABLET BY MOUTH DAILY 9/16/19  Yes Mark Thompson MD   fluticasone (FLONASE) 50 MCG/ACT nasal spray  11/25/19  Yes Mark Thompson MD   gabapentin (NEURONTIN) 400 MG capsule Take 1 capsule by mouth 3 (Three) Times a Day.  Patient taking differently: Take 600 mg by mouth 3 (Three) Times a Day. 2/9/17  Yes Lima Kaminski MD   HYDROcodone-acetaminophen (NORCO) 7.5-325 MG per tablet hydrocodone 7.5 mg-acetaminophen 325 mg  "tablet 12/18/19  Yes Mark Thompson MD   hydrOXYzine pamoate (VISTARIL) 50 MG capsule  12/11/19  Yes Mark Thompson MD   lisinopril (PRINIVIL,ZESTRIL) 10 MG tablet 2 tablets. 9/2/16  Yes Mark Thompson MD   metoprolol succinate XL (TOPROL-XL) 25 MG 24 hr tablet TAKE 1 TABLET BY MOUTH DAILY. 9/2/16  Yes Mark Thompson MD   PARoxetine (PAXIL) 20 MG tablet Take 0.5 tablets by mouth Every Morning. 2/3/20  Yes Gasper Turk MD   promethazine (PHENERGAN) 25 MG tablet Take 1 tablet by mouth Every 6 (Six) Hours. 7/14/15  Yes Mark Thompson MD   tiZANidine (ZANAFLEX) 4 MG tablet TAKE ONE AND ONE HALF TABLET BY MOUTH THREE TIMES A DAY 10/17/19  Yes Mark Thompson MD   omeprazole (priLOSEC) 40 MG capsule TAKE 1 CAPSULE BY MOUTH DAILY. 9/2/16   Mark Thompson MD     I have utilized all available immediate resources to obtain, update, and review the patient's current medications.    Objective     Vital Signs: /95   Pulse 70   Temp 98.6 °F (37 °C) (Oral)   Resp 18   Ht 167.6 cm (66\")   Wt 77.1 kg (170 lb)   SpO2 96%   BMI 27.44 kg/m²   Physical Exam  Vitals and nursing note reviewed.   Constitutional:       Appearance: Normal appearance.   HENT:      Head: Normocephalic and atraumatic.      Right Ear: External ear normal.      Left Ear: External ear normal.      Nose: Nose normal.      Mouth/Throat:      Mouth: Mucous membranes are moist.      Pharynx: Oropharynx is clear.   Eyes:      General: No scleral icterus.     Extraocular Movements: Extraocular movements intact.      Conjunctiva/sclera: Conjunctivae normal.      Pupils: Pupils are equal, round, and reactive to light.   Cardiovascular:      Rate and Rhythm: Regular rhythm. Tachycardia present.      Heart sounds: No murmur heard.  Pulmonary:      Effort: Pulmonary effort is normal.      Breath sounds: Normal breath sounds.   Abdominal:      General: Bowel sounds are normal.      Palpations: Abdomen is soft. "   Musculoskeletal:         General: Normal range of motion.      Cervical back: Normal range of motion and neck supple.   Skin:     General: Skin is warm and dry.      Capillary Refill: Capillary refill takes less than 2 seconds.   Neurological:      General: No focal deficit present.      Mental Status: She is alert. She is disoriented.      Motor: Weakness present.   Psychiatric:         Mood and Affect: Mood normal.         Behavior: Behavior normal.              Results Reviewed:  Lab Results (last 24 hours)     Procedure Component Value Units Date/Time    Salicylate Level [[id number]]  (Normal) Collected: 03/21/23 1521    Specimen: Blood Updated: 03/21/23 1649     Salicylate <0.3 mg/dL     Acetaminophen Level [[id number]]  (Normal) Collected: 03/21/23 1521    Specimen: Blood Updated: 03/21/23 1649     Acetaminophen <5.0 mcg/mL     Extra Tubes [[id number]] Collected: 03/21/23 1521    Specimen: Blood Updated: 03/21/23 1631    Narrative:      The following orders were created for panel order Extra Tubes.  Procedure                               Abnormality         Status                     ---------                               -----------         ------                     Gold Top - SST[[id number]]                                    Final result               Light Blue Top[[id number]]                                    Final result                 Please view results for these tests on the individual orders.    Gold Top - SST [[id number]] Collected: 03/21/23 1521    Specimen: Blood Updated: 03/21/23 1631     Extra Tube Hold for add-ons.     Comment: Auto resulted.       Light Blue Top [[id number]] Collected: 03/21/23 1521    Specimen: Blood Updated: 03/21/23 1631     Extra Tube Hold for add-ons.     Comment: Auto resulted       COVID-19 and FLU A/B PCR - Swab, Nasopharynx [[id number]]  (Normal) Collected: 03/21/23 1555    Specimen: Swab from Nasopharynx Updated: 03/21/23 1623     COVID19 Not Detected     Influenza A PCR  Not Detected     Influenza B PCR Not Detected    Narrative:      Fact sheet for providers: https://www.fda.gov/media/366532/download    Fact sheet for patients: https://www.fda.gov/media/138296/download    Test performed by PCR.    Urine Drug Screen - Urine, Clean Catch [[id number]]  (Abnormal) Collected: 03/21/23 1531    Specimen: Urine, Clean Catch Updated: 03/21/23 1606     THC, Screen, Urine Negative     Phencyclidine (PCP), Urine Negative     Cocaine Screen, Urine Negative     Methamphetamine, Ur Negative     Opiate Screen Positive     Amphetamine Screen, Urine Negative     Benzodiazepine Screen, Urine Negative     Tricyclic Antidepressants Screen Negative     Methadone Screen, Urine Negative     Barbiturates Screen, Urine Negative     Oxycodone Screen, Urine Negative     Propoxyphene Screen Negative     Buprenorphine, Screen, Urine Negative    Narrative:      Cutoff For Drugs Screened:    Amphetamines               500 ng/ml  Barbiturates               200 ng/ml  Benzodiazepines            150 ng/ml  Cocaine                    150 ng/ml  Methadone                  200 ng/ml  Opiates                    100 ng/ml  Phencyclidine               25 ng/ml  THC                            50 ng/ml  Methamphetamine            500 ng/ml  Tricyclic Antidepressants  300 ng/ml  Oxycodone                  100 ng/ml  Propoxyphene               300 ng/ml  Buprenorphine               10 ng/ml    The normal value for all drugs tested is negative. This report includes unconfirmed screening results, with the cutoff values listed, to be used for medical treatment purposes only.  Unconfirmed results must not be used for non-medical purposes such as employment or legal testing.  Clinical consideration should be applied to any drug of abuse test, particularly when unconfirmed results are used.      Lipase [[id number]]  (Abnormal) Collected: 03/21/23 1516    Specimen: Blood Updated: 03/21/23 1554     Lipase 78 U/L     Ethanol [[id number]]  Collected: 03/21/23 1516    Specimen: Blood Updated: 03/21/23 1554     Ethanol <10 mg/dL      Ethanol % <0.010 %     CK [[id number]]  (Abnormal) Collected: 03/21/23 1516    Specimen: Blood Updated: 03/21/23 1554     Creatine Kinase 270 U/L     Magnesium [[id number]]  (Abnormal) Collected: 03/21/23 1516    Specimen: Blood Updated: 03/21/23 1554     Magnesium 1.4 mg/dL     Urinalysis, Microscopic Only - Urine, Clean Catch [[id number]]  (Abnormal) Collected: 03/21/23 1531    Specimen: Urine, Clean Catch Updated: 03/21/23 1552     RBC, UA 0-2 /HPF      WBC, UA 0-2 /HPF      Comment: Urine culture not indicated.        Bacteria, UA None Seen /HPF      Squamous Epithelial Cells, UA 3-5 /HPF      Hyaline Casts, UA None Seen /LPF      Methodology Automated Microscopy    Urinalysis With Culture If Indicated - Urine, Clean Catch [[id number]]  (Abnormal) Collected: 03/21/23 1531    Specimen: Urine, Clean Catch Updated: 03/21/23 1548     Color, UA Yellow     Appearance, UA Clear     pH, UA 5.5     Specific Gravity, UA 1.017     Glucose, UA Negative     Ketones, UA Trace     Bilirubin, UA Negative     Blood, UA Negative     Protein, UA Negative     Leuk Esterase, UA Trace     Nitrite, UA Negative     Urobilinogen, UA 1.0 E.U./dL    Narrative:      In absence of clinical symptoms, the presence of pyuria, bacteria, and/or nitrites on the urinalysis result does not correlate with infection.    Comprehensive Metabolic Panel [[id number]]  (Abnormal) Collected: 03/21/23 1516    Specimen: Blood Updated: 03/21/23 1541     Glucose 129 mg/dL      BUN 12 mg/dL      Creatinine 0.65 mg/dL      Sodium 138 mmol/L      Potassium 3.1 mmol/L      Chloride 104 mmol/L      CO2 25.0 mmol/L      Calcium 9.1 mg/dL      Total Protein 6.5 g/dL      Albumin 3.8 g/dL      ALT (SGPT) 55 U/L      AST (SGOT) 68 U/L      Alkaline Phosphatase 99 U/L      Total Bilirubin 0.3 mg/dL      Globulin 2.7 gm/dL      A/G Ratio 1.4 g/dL      BUN/Creatinine Ratio 18.5     Anion  Gap 9.0 mmol/L      eGFR 102.8 mL/min/1.73     Narrative:      GFR Normal >60  Chronic Kidney Disease <60  Kidney Failure <15      CBC & Differential [[id number]]  (Abnormal) Collected: 03/21/23 1516    Specimen: Blood Updated: 03/21/23 1526    Narrative:      The following orders were created for panel order CBC & Differential.  Procedure                               Abnormality         Status                     ---------                               -----------         ------                     CBC Auto Differential[[id number]]         Abnormal            Final result                 Please view results for these tests on the individual orders.    CBC Auto Differential [[id number]]  (Abnormal) Collected: 03/21/23 1516    Specimen: Blood Updated: 03/21/23 1526     WBC 5.79 10*3/mm3      RBC 3.58 10*6/mm3      Hemoglobin 11.9 g/dL      Hematocrit 34.5 %      MCV 96.4 fL      MCH 33.2 pg      MCHC 34.5 g/dL      RDW 14.4 %      RDW-SD 50.4 fl      MPV 11.2 fL      Platelets 155 10*3/mm3      Neutrophil % 46.2 %      Lymphocyte % 39.7 %      Monocyte % 8.6 %      Eosinophil % 3.6 %      Basophil % 1.6 %      Immature Grans % 0.3 %      Neutrophils, Absolute 2.67 10*3/mm3      Lymphocytes, Absolute 2.30 10*3/mm3      Monocytes, Absolute 0.50 10*3/mm3      Eosinophils, Absolute 0.21 10*3/mm3      Basophils, Absolute 0.09 10*3/mm3      Immature Grans, Absolute 0.02 10*3/mm3      nRBC 0.0 /100 WBC         Imaging Results (Last 24 Hours)     Procedure Component Value Units Date/Time    CT Head Without Contrast [[id number]] Collected: 03/21/23 1710     Updated: 03/21/23 1739    Narrative:      EXAM:  CT HEAD WITHOUT IV CONTRAST    ORDERING PROVIDER:  ALESSIO CHAMBERS    CLINICAL HISTORY:   Mental status change, unknown cause    COMPARISON:      TECHNIQUE:   Nonenhanced CT of the head was performed and reformatted in the  sagittal and coronal planes.     This examination was performed according to our departmental  dose  optimization program which includes automated exposure control,  adjustment of the MA and kV according to patient size, and/or use  of iterative reconstruction technique.    FINDINGS:     CEREBRAL PARENCHYMA:  Chronic microangiopathic changes. No  hemorrhage.  No intracranial mass or mass effect. Age-appropriate  cerebral atrophy.    POSTERIOR FOSSA:  Age-appropriate atrophy of cerebellum and  brainstem.  No cerebellar tonsillar ectopia.    EXTRA-AXIAL SPACES:  Normal size and configuration.  No mass,  fluid collection or hemorrhage.    ORBITS: No mass. Unremarkable extraocular muscles, globe and  optic nerve.    CALVARIA AND SOFT TISSUES:  No mass or adenopathy, lytic or  sclerotic lesion.    TEMPORAL BONE AND SKULL BASE:  Unremarkable middle and inner ear  , and mastoid air cells.    PARANASAL SINUSES AND FACIAL BONES: Unremarkable.     VASCULAR STRUCTURES:  Unremarkable.      Impression:      1.  No acute intracranial process.  2.  Scattered chronic microangiopathic changes.          Electronically signed by:  Travis Tavarez MD  3/21/2023 5:37 PM CDT  Workstation: [id number]        I have personally reviewed and interpreted the radiology studies and ECG obtained at time of admission.     Assessment / Plan     Assessment:   Active Hospital Problems    Diagnosis    • **Hallucinations      Impressions/plan  Alcohol withdrawal  Psych consult  Valium 5 mg IV every 6 hours as needed  Banana bag  One-to-one monitoring  Cardiac monitoring    Tobacco dependency  Nicotine patch  DuoNebs every 6 hours as needed    Hypertension  Lisinopril  Metoprolol    History of bipolar disorder  Psych management    DVT prophylaxis Lovenox  CODE STATUS full code           Medical Decision Making  Number and Complexity of problems: Complex problem  Differential Diagnosis: Alcohol withdrawal versus altered mental status         [facility] Data  External documents reviewed: Hospital charting  My EKG interpretation: N/A  My plain film  interpretation:   Tests considered but not ordered:      Decision rules/scores evaluated (example [id number], Wells, etc):      Discussed with: Dr. Mejia and the patient's sisters and they agree to current care plan     Treatment Plan  Above    Care Planning  Shared decision making: Patient updated on current status and informed of proposed care plan; is in agreement with plan  Code status and discussions: Full code    Disposition  Social Determinants of Health that impact treatment or disposition:   I expect the patient to be discharged to [facility] in 24 to 48 hours    I confirmed that the patient's Advance Care Plan is present, code status is documented, or surrogate decision maker is listed in the patient's medical record.       The patient's surrogate decision maker is patient's sister    I discussed my findings and recommendations with the patient's sister at bedside.    Estimated length of stay is to be determined    The patient was seen and examined by me on 3/21/2023 at 7 PM    Electronically signed by William Martinez DO, 03/21/23, 19:43 CDT.

## 2023-03-22 NOTE — PROGRESS NOTES
[facility] Medicine Services  INPATIENT PROGRESS NOTE    Length of Stay: 0  Date of Admission: 3/21/2023  Primary Care Physician: Jayy Johnson MD    Subjective   (S) Admitted for visual hallucinations  Today she looks more alert; she has not hallucinations now    Review of Systems   All other systems reviewed and are negative.       All pertinent negatives and positives are as above. All other systems have been reviewed and are negative unless otherwise stated.     Prior to Admission medications    Medication Sig Start Date End Date Taking? Authorizing Provider   5-Hydroxytryptophan 50 MG capsule Take  by mouth.   Yes Mark Thompson MD   ARIPiprazole (ABILIFY) 5 MG tablet Take 1 tablet by mouth Daily. 2/9/17  Yes Lima Kaminski MD   aspirin 81 MG chewable tablet Chew 1 tablet Daily.   Yes Mark Thompson MD   Black Cohosh 540 MG capsule Take  by mouth Daily.   Yes Mark Thompson MD   Calcium Carbonate 1500 (600 Ca) MG tablet Take 1 tablet by mouth Daily.   Yes Mark Thompson MD   cetirizine (zyrTEC) 10 MG tablet TAKE ONE TABLET BY MOUTH DAILY 9/16/19  Yes Mark Thompson MD   fluticasone (FLONASE) 50 MCG/ACT nasal spray  11/25/19  Yes Mark Thompson MD   gabapentin (NEURONTIN) 400 MG capsule Take 1 capsule by mouth 3 (Three) Times a Day.  Patient taking differently: Take 600 mg by mouth 3 (Three) Times a Day. 2/9/17  Yes Lima Kaminski MD   HYDROcodone-acetaminophen (NORCO) 7.5-325 MG per tablet hydrocodone 7.5 mg-acetaminophen 325 mg tablet 12/18/19  Yes Mark Thompson MD   hydrOXYzine pamoate (VISTARIL) 50 MG capsule  12/11/19  Yes Mark Thompson MD   lisinopril (PRINIVIL,ZESTRIL) 10 MG tablet 2 tablets. 9/2/16  Yes Mark Thompson MD   metoprolol succinate XL (TOPROL-XL) 25 MG 24 hr tablet TAKE 1 TABLET BY MOUTH DAILY. 9/2/16  Yes Mark Thompson MD   PARoxetine (PAXIL) 20 MG tablet Take 0.5  tablets by mouth Every Morning. 2/3/20  Yes Gasper Turk MD   promethazine (PHENERGAN) 25 MG tablet Take 1 tablet by mouth Every 6 (Six) Hours. 7/14/15  Yes Mark Thompson MD   tiZANidine (ZANAFLEX) 4 MG tablet TAKE ONE AND ONE HALF TABLET BY MOUTH THREE TIMES A DAY 10/17/19  Yes Mark Thompson MD   omeprazole (priLOSEC) 40 MG capsule TAKE 1 CAPSULE BY MOUTH DAILY. 9/2/16   Mark Thompson MD       aspirin, 81 mg, Oral, Daily  enoxaparin, 40 mg, Subcutaneous, Daily  nicotine, 1 patch, Transdermal, Q24H  pantoprazole, 40 mg, Oral, Q AM  PHENobarbital IVPB in 100 mL, 65 mg, Intravenous, Once   Followed by  [START ON 3/23/2023] PHENobarbital IVPB in 100 mL, 65 mg, Intravenous, Once   Followed by  [START ON 3/23/2023] PHENobarbital, 32.4 mg, Oral, Once   Followed by  [START ON 3/24/2023] PHENobarbital, 32.4 mg, Oral, Once   Followed by  [START ON 3/24/2023] PHENobarbital, 32.4 mg, Oral, Once  sodium chloride, 10 mL, Intravenous, Q12H  IV Fluids 1000 mL + additives, 100 mL/hr, Intravenous, Daily  thiamine (VITAMIN B1) IVPB, 500 mg, Intravenous, Q8H           Objective    Temp:  [97.2 °F (36.2 °C)-98.6 °F (37 °C)] 97.2 °F (36.2 °C)  Heart Rate:  [64-88] 66  Resp:  [16-20] 16  BP: (126-187)/() 170/66    Physical Exam  Vitals reviewed.   Constitutional:       Appearance: Normal appearance.   HENT:      Head: Normocephalic.      Nose: Nose normal.      Mouth/Throat:      Mouth: Mucous membranes are moist.   Eyes:      Extraocular Movements: Extraocular movements intact.   Cardiovascular:      Rate and Rhythm: Normal rate and regular rhythm.      Heart sounds: Normal heart sounds.   Pulmonary:      Effort: Pulmonary effort is normal.      Breath sounds: Normal breath sounds.   Abdominal:      Palpations: Abdomen is soft.   Musculoskeletal:         General: Normal range of motion.      Cervical back: Normal range of motion and neck supple.   Skin:     General: Skin is warm.   Neurological:       General: No focal deficit present.      Mental Status: She is alert. Mental status is at baseline.   Psychiatric:         Behavior: Behavior normal.           Results Review:  I have reviewed the labs, radiology results, and diagnostic studies.    Laboratory Data:   Results from last 7 days   Lab Units 03/22/23  0549 03/21/23  1516   SODIUM mmol/L 144 138   POTASSIUM mmol/L 3.3* 3.1*   CHLORIDE mmol/L 112* 104   CO2 mmol/L 23.0 25.0   BUN mg/dL 10 12   CREATININE mg/dL 0.56* 0.65   GLUCOSE mg/dL 111* 129*   CALCIUM mg/dL 8.4* 9.1   BILIRUBIN mg/dL 0.4 0.3   ALK PHOS U/L 80 99   ALT (SGPT) U/L 44* 55*   AST (SGOT) U/L 58* 68*   ANION GAP mmol/L 9.0 9.0     Estimated Creatinine Clearance: 119.5 mL/min (A) (by C-G formula based on SCr of 0.56 mg/dL (L)).  Results from last 7 days   Lab Units 03/22/23  0549 03/21/23  1516   MAGNESIUM mg/dL 2.0 1.4*         Results from last 7 days   Lab Units 03/22/23  0549 03/21/23 2057 03/21/23  1516   WBC 10*3/mm3 4.34 5.22 5.79   HEMOGLOBIN g/dL 11.3* 11.5* 11.9*   HEMATOCRIT % 32.4* 32.8* 34.5   PLATELETS 10*3/mm3 135* 137* 155           Culture Data:   No results found for: BLOODCX  No results found for: URINECX  No results found for: RESPCX  No results found for: WOUNDCX  No results found for: STOOLCX  No components found for: BODYFLD    Radiology Data:   Imaging Results (Last 24 Hours)     Procedure Component Value Units Date/Time    CT Head Without Contrast [[id number]] Collected: 03/21/23 1710     Updated: 03/21/23 1739    Narrative:      EXAM:  CT HEAD WITHOUT IV CONTRAST    ORDERING PROVIDER:  ALESSIO CHAMBERS    CLINICAL HISTORY:   Mental status change, unknown cause    COMPARISON:      TECHNIQUE:   Nonenhanced CT of the head was performed and reformatted in the  sagittal and coronal planes.     This examination was performed according to our departmental dose  optimization program which includes automated exposure control,  adjustment of the MA and kV according to  patient size, and/or use  of iterative reconstruction technique.    FINDINGS:     CEREBRAL PARENCHYMA:  Chronic microangiopathic changes. No  hemorrhage.  No intracranial mass or mass effect. Age-appropriate  cerebral atrophy.    POSTERIOR FOSSA:  Age-appropriate atrophy of cerebellum and  brainstem.  No cerebellar tonsillar ectopia.    EXTRA-AXIAL SPACES:  Normal size and configuration.  No mass,  fluid collection or hemorrhage.    ORBITS: No mass. Unremarkable extraocular muscles, globe and  optic nerve.    CALVARIA AND SOFT TISSUES:  No mass or adenopathy, lytic or  sclerotic lesion.    TEMPORAL BONE AND SKULL BASE:  Unremarkable middle and inner ear  , and mastoid air cells.    PARANASAL SINUSES AND FACIAL BONES: Unremarkable.     VASCULAR STRUCTURES:  Unremarkable.      Impression:      1.  No acute intracranial process.  2.  Scattered chronic microangiopathic changes.          Electronically signed by:  Travis Tavarez MD  3/21/2023 5:37 PM CDT  Workstation: [id number]          I have reviewed the patient's current medications.     Assessment/Plan     Alcohol use disorder with withdrawal     With visual hallucinations     CT brain negative for an acute event     Ativan did not work switched to valium    Chronic medical problems     Chronic recurrent depression     Generalized anxiety disorder     Essential hypertension     Hx of suicide attempts    Medical Decision Making  Number and Complexity of problems: one major    Conditions and Status:        Condition is improving.     [facility] Data  External documents reviewed: previous medical records  My EKG interpretation: n/a  My plain film interpretation: no acute event     Decision rules/scores evaluated (example [id number], Wells, etc):n/a     Discussed with: patient and RN     Treatment Plan  Hydration, vitamin and mineral; benzos    Care Planning  Shared decision making: her sister Gladis  Code status and discussions:full code    Disposition  Social Determinants of  Health that impact treatment or disposition: her mental status  I expect the patient to be discharged home in 3 to 4 days      I confirmed that the patient's Advance Care Plan is present, code status is documented, or surrogate decision maker is listed in the patient's medical record.     Michael Whitney MD

## 2023-03-22 NOTE — ED NOTES
Pt disoriented x 4 at this time. Pt trying to eat pulse ox. This rn and sitter trying to get pt back into bed at this time. MD at bedside to assess patient.

## 2023-03-22 NOTE — DISCHARGE INSTRUCTIONS
[facility]    Patient Age: 54 year old     Refill to be: Phoned to [phone] pharmacy    Other     Medication requested to be refilled:     Vit D          Next and Last Visit with Provider and Department  Last visit with this provider: Visit date not found  Last visit with this department: Visit date not found    Next visit with this provider: 4/25/2019  Next visit with this department: 4/25/2019      Current Medications    No medications on file       ALLERGIES: not on file.    PHARMACY to use: [facility]          Pharmacy preference(s) on file: No Pharmacies Listed    CALL BACK INFO: Ok to leave response (including medical information) with family member or on answering machine    PCP: No primary care provider on file.         INS: No account information available.     PATIENT ADDRESS:  [address] [place] [id number]     --Ensure that all mediations (including over the counter meds) are secured in a lock box and that you use a weekly pill planner.    --Ensure all weapons if present (including firearms) are secured (ideally in a gun safe or removed from home) and all ammo is secured and stored separately.  --Continue medications as currently prescribed.  --Continue follow-up with outpatient providers.  --Please contact our Behavioral Health Unit with any questions or concerns at [phone].  --Return to the ER with any suicidal or homicidal ideation, or worsening symptoms.    --The number for the National Suicide & Crisis Hotline is 988.  The National Crisis Text number is 988.  These may be contacted at any time, if needed.

## 2023-03-23 LAB — POTASSIUM SERPL-SCNC: 4 MMOL/L (ref 3.5–5.2)

## 2023-03-23 PROCEDURE — 84132 ASSAY OF SERUM POTASSIUM: CPT

## 2023-03-23 PROCEDURE — 25010000002 HYDRALAZINE PER 20 MG: Performed by: INTERNAL MEDICINE

## 2023-03-23 PROCEDURE — 25010000002 DIAZEPAM PER 5 MG

## 2023-03-23 PROCEDURE — 25010000002 PHENOBARBITAL PER 120 MG

## 2023-03-23 PROCEDURE — 25010000002 THIAMINE PER 100 MG: Performed by: HOSPITALIST

## 2023-03-23 PROCEDURE — 25010000002 ENOXAPARIN PER 10 MG: Performed by: HOSPITALIST

## 2023-03-23 PROCEDURE — 25010000002 THIAMINE PER 100 MG: Performed by: PSYCHIATRY & NEUROLOGY

## 2023-03-23 RX ORDER — HYDROCODONE BITARTRATE AND ACETAMINOPHEN 7.5; 325 MG/1; MG/1
1 TABLET ORAL EVERY 8 HOURS PRN
Status: DISCONTINUED | OUTPATIENT
Start: 2023-03-23 | End: 2023-03-24 | Stop reason: HOSPADM

## 2023-03-23 RX ORDER — HYDRALAZINE HYDROCHLORIDE 20 MG/ML
10 INJECTION INTRAMUSCULAR; INTRAVENOUS EVERY 6 HOURS PRN
Status: DISCONTINUED | OUTPATIENT
Start: 2023-03-23 | End: 2023-03-24 | Stop reason: HOSPADM

## 2023-03-23 RX ORDER — METOPROLOL SUCCINATE 25 MG/1
25 TABLET, EXTENDED RELEASE ORAL ONCE
Status: COMPLETED | OUTPATIENT
Start: 2023-03-23 | End: 2023-03-23

## 2023-03-23 RX ORDER — METOPROLOL SUCCINATE 50 MG/1
50 TABLET, EXTENDED RELEASE ORAL DAILY
Status: DISCONTINUED | OUTPATIENT
Start: 2023-03-24 | End: 2023-03-24 | Stop reason: HOSPADM

## 2023-03-23 RX ORDER — METOPROLOL SUCCINATE 25 MG/1
25 TABLET, EXTENDED RELEASE ORAL DAILY
Status: DISCONTINUED | OUTPATIENT
Start: 2023-03-23 | End: 2023-03-23

## 2023-03-23 RX ADMIN — OXYCODONE HYDROCHLORIDE AND ACETAMINOPHEN 1000 MG: 500 TABLET ORAL at 12:41

## 2023-03-23 RX ADMIN — Medication 10 ML: at 20:33

## 2023-03-23 RX ADMIN — THIAMINE HYDROCHLORIDE 500 MG: 100 INJECTION, SOLUTION INTRAMUSCULAR; INTRAVENOUS at 00:24

## 2023-03-23 RX ADMIN — FLUTICASONE PROPIONATE 2 SPRAY: 50 SPRAY, METERED NASAL at 08:54

## 2023-03-23 RX ADMIN — HYDRALAZINE HYDROCHLORIDE 10 MG: 20 INJECTION INTRAMUSCULAR; INTRAVENOUS at 17:18

## 2023-03-23 RX ADMIN — PANTOPRAZOLE SODIUM 40 MG: 40 TABLET, DELAYED RELEASE ORAL at 05:21

## 2023-03-23 RX ADMIN — Medication 250 MG: at 08:54

## 2023-03-23 RX ADMIN — Medication 10 ML: at 08:55

## 2023-03-23 RX ADMIN — PHENOBARBITAL 32.4 MG: 32.4 TABLET ORAL at 16:35

## 2023-03-23 RX ADMIN — OXYCODONE HYDROCHLORIDE AND ACETAMINOPHEN 1000 MG: 500 TABLET ORAL at 17:18

## 2023-03-23 RX ADMIN — THIAMINE HYDROCHLORIDE 500 MG: 100 INJECTION, SOLUTION INTRAMUSCULAR; INTRAVENOUS at 16:35

## 2023-03-23 RX ADMIN — PHENOBARBITAL SODIUM 65 MG: 65 INJECTION INTRAMUSCULAR; INTRAVENOUS at 05:20

## 2023-03-23 RX ADMIN — HYDROCODONE BITARTRATE AND ACETAMINOPHEN 1 TABLET: 7.5; 325 TABLET ORAL at 09:51

## 2023-03-23 RX ADMIN — THIAMINE HYDROCHLORIDE 500 MG: 100 INJECTION, SOLUTION INTRAMUSCULAR; INTRAVENOUS at 08:53

## 2023-03-23 RX ADMIN — HYDROCODONE BITARTRATE AND ACETAMINOPHEN 1 TABLET: 7.5; 325 TABLET ORAL at 21:32

## 2023-03-23 RX ADMIN — METOPROLOL SUCCINATE 25 MG: 25 TABLET, FILM COATED, EXTENDED RELEASE ORAL at 16:35

## 2023-03-23 RX ADMIN — ASPIRIN 81 MG: 81 TABLET, CHEWABLE ORAL at 08:54

## 2023-03-23 RX ADMIN — Medication 200 MG: at 08:53

## 2023-03-23 RX ADMIN — NICOTINE 1 PATCH: 21 PATCH, EXTENDED RELEASE TRANSDERMAL at 20:32

## 2023-03-23 RX ADMIN — DIAZEPAM 5 MG: 5 INJECTION, SOLUTION INTRAMUSCULAR; INTRAVENOUS at 18:30

## 2023-03-23 RX ADMIN — THIAMINE HYDROCHLORIDE 100 ML/HR: 100 INJECTION, SOLUTION INTRAMUSCULAR; INTRAVENOUS at 20:32

## 2023-03-23 RX ADMIN — ENOXAPARIN SODIUM 40 MG: 40 INJECTION SUBCUTANEOUS at 20:32

## 2023-03-23 RX ADMIN — METOPROLOL SUCCINATE 25 MG: 25 TABLET, FILM COATED, EXTENDED RELEASE ORAL at 09:50

## 2023-03-23 RX ADMIN — OXYCODONE HYDROCHLORIDE AND ACETAMINOPHEN 1000 MG: 500 TABLET ORAL at 08:54

## 2023-03-23 RX ADMIN — LISINOPRIL 20 MG: 20 TABLET ORAL at 08:54

## 2023-03-23 RX ADMIN — CYANOCOBALAMIN TAB 1000 MCG 1000 MCG: 1000 TAB at 08:54

## 2023-03-23 NOTE — DISCHARGE INSTR - OTHER ORDERS
[facility] appointment scheduled for May 9, 2023 at 3:00 PM.  Please call weekly to check availability for a earlier appointment.

## 2023-03-23 NOTE — PLAN OF CARE
Goal Outcome Evaluation: Patient resting in bed throughout shift. Eager to DC, finish phenobarb taper tomorrow. Tele DC.  BP elevated, orders placed by physician.

## 2023-03-23 NOTE — PROGRESS NOTES
[facility] Medicine Services  INPATIENT PROGRESS NOTE    Length of Stay: 1  Date of Admission: 3/21/2023  Primary Care Physician: Jayy Johnson MD    Subjective   (S) Admitted for visual hallucinations  Mentally she is back to her normal self;     Review of Systems   All other systems reviewed and are negative.       All pertinent negatives and positives are as above. All other systems have been reviewed and are negative unless otherwise stated.     Prior to Admission medications    Medication Sig Start Date End Date Taking? Authorizing Provider   5-Hydroxytryptophan 50 MG capsule Take  by mouth.   Yes Mark Thompson MD   ARIPiprazole (ABILIFY) 5 MG tablet Take 1 tablet by mouth Daily. 2/9/17  Yes Lima Kaminski MD   aspirin 81 MG chewable tablet Chew 1 tablet Daily.   Yes Mark Thompson MD   Black Cohosh 540 MG capsule Take  by mouth Daily.   Yes Mark Thompson MD   Calcium Carbonate 1500 (600 Ca) MG tablet Take 1 tablet by mouth Daily.   Yes Mark Thompson MD   cetirizine (zyrTEC) 10 MG tablet TAKE ONE TABLET BY MOUTH DAILY 9/16/19  Yes Mark Thompson MD   fluticasone (FLONASE) 50 MCG/ACT nasal spray  11/25/19  Yes ProviderMark MD   gabapentin (NEURONTIN) 400 MG capsule Take 1 capsule by mouth 3 (Three) Times a Day.  Patient taking differently: Take 600 mg by mouth 3 (Three) Times a Day. 2/9/17  Yes Lima Kaminski MD   HYDROcodone-acetaminophen (NORCO) 7.5-325 MG per tablet hydrocodone 7.5 mg-acetaminophen 325 mg tablet 12/18/19  Yes Mark Thompson MD   hydrOXYzine pamoate (VISTARIL) 50 MG capsule  12/11/19  Yes Mark Thompson MD   lisinopril (PRINIVIL,ZESTRIL) 10 MG tablet 2 tablets. 9/2/16  Yes Mark Thompson MD   metoprolol succinate XL (TOPROL-XL) 25 MG 24 hr tablet TAKE 1 TABLET BY MOUTH DAILY. 9/2/16  Yes Mark Thompson MD   PARoxetine (PAXIL) 20 MG tablet Take 0.5 tablets by mouth  Every Morning. 2/3/20  Yes Gsaper Turk MD   promethazine (PHENERGAN) 25 MG tablet Take 1 tablet by mouth Every 6 (Six) Hours. 7/14/15  Yes Mark Thompson MD   tiZANidine (ZANAFLEX) 4 MG tablet TAKE ONE AND ONE HALF TABLET BY MOUTH THREE TIMES A DAY 10/17/19  Yes Mark Thompson MD   omeprazole (priLOSEC) 40 MG capsule TAKE 1 CAPSULE BY MOUTH DAILY. 9/2/16   Mark Tohmpson MD       ascorbic acid, 1,000 mg, Oral, TID With Meals  aspirin, 81 mg, Oral, Daily  enoxaparin, 40 mg, Subcutaneous, Daily  fluticasone, 2 spray, Each Nare, Daily  lisinopril, 20 mg, Oral, Q24H  metoprolol succinate XL, 25 mg, Oral, Daily  nicotine, 1 patch, Transdermal, Q24H  pantoprazole, 40 mg, Oral, Q AM  PHENobarbital, 32.4 mg, Oral, Once   Followed by  [START ON 3/24/2023] PHENobarbital, 32.4 mg, Oral, Once   Followed by  [START ON 3/24/2023] PHENobarbital, 32.4 mg, Oral, Once  sodium chloride, 10 mL, Intravenous, Q12H  IV Fluids 1000 mL + additives, 100 mL/hr, Intravenous, Daily  thiamine (VITAMIN B1) IVPB, 500 mg, Intravenous, Q8H  vitamin B-1, 250 mg, Oral, Daily  vitamin B-12, 1,000 mcg, Oral, Daily  vitamin B-6, 200 mg, Oral, Daily           Objective    Temp:  [97.3 °F (36.3 °C)-99.4 °F (37.4 °C)] 99.4 °F (37.4 °C)  Heart Rate:  [65-86] 86  Resp:  [16-18] 16  BP: (158-183)/(77-99) 158/77    Physical Exam  Vitals reviewed.   Constitutional:       Appearance: Normal appearance.   HENT:      Head: Normocephalic.      Nose: Nose normal.      Mouth/Throat:      Mouth: Mucous membranes are moist.   Eyes:      Extraocular Movements: Extraocular movements intact.   Cardiovascular:      Rate and Rhythm: Normal rate and regular rhythm.      Heart sounds: Normal heart sounds.   Pulmonary:      Effort: Pulmonary effort is normal.      Breath sounds: Normal breath sounds.   Abdominal:      Palpations: Abdomen is soft.   Musculoskeletal:         General: Normal range of motion.      Cervical back: Normal range of motion and  neck supple.   Skin:     General: Skin is warm.   Neurological:      General: No focal deficit present.      Mental Status: She is alert. Mental status is at baseline.   Psychiatric:         Behavior: Behavior normal.           Results Review:  I have reviewed the labs, radiology results, and diagnostic studies.    Laboratory Data:   Results from last 7 days   Lab Units 03/23/23  0125 03/22/23  0549 03/21/23  1516   SODIUM mmol/L  --  144 138   POTASSIUM mmol/L 4.0 3.3* 3.1*   CHLORIDE mmol/L  --  112* 104   CO2 mmol/L  --  23.0 25.0   BUN mg/dL  --  10 12   CREATININE mg/dL  --  0.56* 0.65   GLUCOSE mg/dL  --  111* 129*   CALCIUM mg/dL  --  8.4* 9.1   BILIRUBIN mg/dL  --  0.4 0.3   ALK PHOS U/L  --  80 99   ALT (SGPT) U/L  --  44* 55*   AST (SGOT) U/L  --  58* 68*   ANION GAP mmol/L  --  9.0 9.0     Estimated Creatinine Clearance: 119.5 mL/min (A) (by C-G formula based on SCr of 0.56 mg/dL (L)).  Results from last 7 days   Lab Units 03/22/23  0549 03/21/23  1516   MAGNESIUM mg/dL 2.0 1.4*         Results from last 7 days   Lab Units 03/22/23  0549 03/21/23  2057 03/21/23  1516   WBC 10*3/mm3 4.34 5.22 5.79   HEMOGLOBIN g/dL 11.3* 11.5* 11.9*   HEMATOCRIT % 32.4* 32.8* 34.5   PLATELETS 10*3/mm3 135* 137* 155           Culture Data:   No results found for: BLOODCX  No results found for: URINECX  No results found for: RESPCX  No results found for: WOUNDCX  No results found for: STOOLCX  No components found for: BODYFLD    Radiology Data:   Imaging Results (Last 24 Hours)     ** No results found for the last 24 hours. **          I have reviewed the patient's current medications.     Assessment/Plan     Alcohol use disorder with withdrawal     With visual hallucinations     CT brain negative for an acute event     Not present at the current time    Chronic medical problems     Chronic recurrent depression     Generalized anxiety disorder     Essential hypertension     Hx of suicide attempts    Psych recommends taper  phenobarbital, which ends tomorrow afternon    OK to discontinue restrains and monitor    Medical Decision Making  Number and Complexity of problems: one major    Conditions and Status:        Condition is improving.     MDM Data  External documents reviewed: previous medical records  My EKG interpretation: n/a  My plain film interpretation: no acute event     Decision rules/scores evaluated (example [id number], Wells, etc):n/a     Discussed with: patient and RN     Treatment Plan  Hydration, vitamin and mineral;    Care Planning  Shared decision making: her sister Gladis  Code status and discussions:full code    Disposition  Social Determinants of Health that impact treatment or disposition: her mental status  I expect the patient to be discharged home tomorrow      I confirmed that the patient's Advance Care Plan is present, code status is documented, or surrogate decision maker is listed in the patient's medical record.     iMchael Whitney MD

## 2023-03-23 NOTE — PLAN OF CARE
Goal Outcome Evaluation:              Outcome Evaluation: no acute changes this shift. A&O. vss. patient resting in between care.

## 2023-03-24 VITALS
HEIGHT: 66 IN | DIASTOLIC BLOOD PRESSURE: 69 MMHG | WEIGHT: 180.1 LBS | BODY MASS INDEX: 28.94 KG/M2 | TEMPERATURE: 98.1 F | RESPIRATION RATE: 18 BRPM | SYSTOLIC BLOOD PRESSURE: 157 MMHG | OXYGEN SATURATION: 95 % | HEART RATE: 65 BPM

## 2023-03-24 LAB
POTASSIUM SERPL-SCNC: 3.5 MMOL/L (ref 3.5–5.2)
WHOLE BLOOD HOLD SPECIMEN: NORMAL

## 2023-03-24 PROCEDURE — 25010000002 THIAMINE PER 100 MG: Performed by: PSYCHIATRY & NEUROLOGY

## 2023-03-24 PROCEDURE — 84132 ASSAY OF SERUM POTASSIUM: CPT | Performed by: INTERNAL MEDICINE

## 2023-03-24 RX ORDER — GABAPENTIN 800 MG/1
800 TABLET ORAL 3 TIMES DAILY
COMMUNITY

## 2023-03-24 RX ORDER — PHENOBARBITAL 32.4 MG/1
32.4 TABLET ORAL ONCE
Status: COMPLETED | OUTPATIENT
Start: 2023-03-24 | End: 2023-03-24

## 2023-03-24 RX ORDER — ROSUVASTATIN CALCIUM 20 MG/1
20 TABLET, COATED ORAL DAILY
COMMUNITY

## 2023-03-24 RX ADMIN — THIAMINE HYDROCHLORIDE 500 MG: 100 INJECTION, SOLUTION INTRAMUSCULAR; INTRAVENOUS at 08:43

## 2023-03-24 RX ADMIN — HYDROCODONE BITARTRATE AND ACETAMINOPHEN 1 TABLET: 7.5; 325 TABLET ORAL at 08:43

## 2023-03-24 RX ADMIN — THIAMINE HYDROCHLORIDE 500 MG: 100 INJECTION, SOLUTION INTRAMUSCULAR; INTRAVENOUS at 00:08

## 2023-03-24 RX ADMIN — Medication 10 ML: at 08:47

## 2023-03-24 RX ADMIN — LISINOPRIL 20 MG: 20 TABLET ORAL at 08:42

## 2023-03-24 RX ADMIN — FLUTICASONE PROPIONATE 2 SPRAY: 50 SPRAY, METERED NASAL at 08:44

## 2023-03-24 RX ADMIN — OXYCODONE HYDROCHLORIDE AND ACETAMINOPHEN 1000 MG: 500 TABLET ORAL at 11:41

## 2023-03-24 RX ADMIN — METOPROLOL SUCCINATE 50 MG: 50 TABLET, EXTENDED RELEASE ORAL at 09:30

## 2023-03-24 RX ADMIN — PHENOBARBITAL 32.4 MG: 32.4 TABLET ORAL at 05:39

## 2023-03-24 RX ADMIN — Medication 200 MG: at 08:42

## 2023-03-24 RX ADMIN — Medication 250 MG: at 08:43

## 2023-03-24 RX ADMIN — ASPIRIN 81 MG: 81 TABLET, CHEWABLE ORAL at 08:42

## 2023-03-24 RX ADMIN — PANTOPRAZOLE SODIUM 40 MG: 40 TABLET, DELAYED RELEASE ORAL at 05:39

## 2023-03-24 RX ADMIN — PHENOBARBITAL 32.4 MG: 32.4 TABLET ORAL at 13:30

## 2023-03-24 RX ADMIN — CYANOCOBALAMIN TAB 1000 MCG 1000 MCG: 1000 TAB at 08:43

## 2023-03-24 RX ADMIN — OXYCODONE HYDROCHLORIDE AND ACETAMINOPHEN 1000 MG: 500 TABLET ORAL at 08:43

## 2023-03-24 NOTE — DISCHARGE SUMMARY
[facility] Medicine Services  DISCHARGE SUMMARY       Date of Admission: 3/21/2023  Date of Discharge:  3/24/2023  Primary Care Physician: Jayy Johnson MD    Presenting Problem/History of Present Illness:  Hallucinations [R44.3]  Hypokalemia [E87.6]  Hypomagnesemia [E83.42]  Altered mental status, unspecified altered mental status type [R41.82]  Alcohol withdrawal (HCC) [F10.939]       Final Discharge Diagnoses:  Alcohol use disorder with withdrawal     With visual hallucinations which are not present      CT brain negative for an acute event     shed did not present alcohol withdrawal     Chronic medical problems     Chronic recurrent depression     Generalized anxiety disorder     Essential hypertension     Hx of suicide attempts    Consults:   Consults     Date and Time Order Name Status Description    3/21/2023  7:10 PM Hospitalist (on-call MD unless specified)      3/21/2023  7:10 PM Psychiatry (on-call MD unless specified) Completed           Procedures Performed:                 Pertinent Test Results:   Lab Results (most recent)     Procedure Component Value Units Date/Time    Potassium [[id number]]  (Normal) Collected: 03/24/23 0553    Specimen: Blood Updated: 03/24/23 0741     Potassium 3.5 mmol/L     Extra Tubes [[id number]] Collected: 03/24/23 0553    Specimen: Blood, Venous Line Updated: 03/24/23 0715    Narrative:      The following orders were created for panel order Extra Tubes.  Procedure                               Abnormality         Status                     ---------                               -----------         ------                     Lavender Top[[id number]]                                     Final result                 Please view results for these tests on the individual orders.    Lavender Top [[id number]] Collected: 03/24/23 0553    Specimen: Blood Updated: 03/24/23 0715     Extra Tube hold for add-on     Comment: Auto resulted        Potassium [[id number]]  (Normal) Collected: 03/23/23 0125    Specimen: Blood Updated: 03/23/23 0159     Potassium 4.0 mmol/L     TSH [[id number]]  (Abnormal) Collected: 03/22/23 0549    Specimen: Blood Updated: 03/22/23 0655     TSH 5.560 uIU/mL     Magnesium [[id number]]  (Normal) Collected: 03/22/23 0549    Specimen: Blood Updated: 03/22/23 0652     Magnesium 2.0 mg/dL     Comprehensive Metabolic Panel [[id number]]  (Abnormal) Collected: 03/22/23 0549    Specimen: Blood Updated: 03/22/23 0652     Glucose 111 mg/dL      BUN 10 mg/dL      Creatinine 0.56 mg/dL      Sodium 144 mmol/L      Potassium 3.3 mmol/L      Chloride 112 mmol/L      CO2 23.0 mmol/L      Calcium 8.4 mg/dL      Total Protein 5.4 g/dL      Albumin 3.2 g/dL      ALT (SGPT) 44 U/L      AST (SGOT) 58 U/L      Alkaline Phosphatase 80 U/L      Total Bilirubin 0.4 mg/dL      Globulin 2.2 gm/dL      A/G Ratio 1.5 g/dL      BUN/Creatinine Ratio 17.9     Anion Gap 9.0 mmol/L      eGFR 106.6 mL/min/1.73     Narrative:      GFR Normal >60  Chronic Kidney Disease <60  Kidney Failure <15      CBC Auto Differential [[id number]]  (Abnormal) Collected: 03/22/23 0549    Specimen: Blood Updated: 03/22/23 0628     WBC 4.34 10*3/mm3      RBC 3.36 10*6/mm3      Hemoglobin 11.3 g/dL      Hematocrit 32.4 %      MCV 96.4 fL      MCH 33.6 pg      MCHC 34.9 g/dL      RDW 14.4 %      RDW-SD 50.8 fl      MPV 11.9 fL      Platelets 135 10*3/mm3      Neutrophil % 47.7 %      Lymphocyte % 37.8 %      Monocyte % 8.3 %      Eosinophil % 4.1 %      Basophil % 1.6 %      Immature Grans % 0.5 %      Neutrophils, Absolute 2.07 10*3/mm3      Lymphocytes, Absolute 1.64 10*3/mm3      Monocytes, Absolute 0.36 10*3/mm3      Eosinophils, Absolute 0.18 10*3/mm3      Basophils, Absolute 0.07 10*3/mm3      Immature Grans, Absolute 0.02 10*3/mm3      nRBC 0.0 /100 WBC     CBC & Differential [[id number]]  (Abnormal) Collected: 03/21/23 2057    Specimen: Blood Updated: 03/21/23 2103    Narrative:       The following orders were created for panel order CBC & Differential.  Procedure                               Abnormality         Status                     ---------                               -----------         ------                     CBC Auto Differential[[id number]]        Abnormal            Final result                 Please view results for these tests on the individual orders.    CBC Auto Differential [[id number]]  (Abnormal) Collected: 03/21/23 2057    Specimen: Blood Updated: 03/21/23 2103     WBC 5.22 10*3/mm3      RBC 3.39 10*6/mm3      Hemoglobin 11.5 g/dL      Hematocrit 32.8 %      MCV 96.8 fL      MCH 33.9 pg      MCHC 35.1 g/dL      RDW 14.6 %      RDW-SD 51.8 fl      MPV 11.4 fL      Platelets 137 10*3/mm3      Neutrophil % 40.8 %      Lymphocyte % 43.3 %      Monocyte % 10.0 %      Eosinophil % 4.0 %      Basophil % 1.7 %      Immature Grans % 0.2 %      Neutrophils, Absolute 2.13 10*3/mm3      Lymphocytes, Absolute 2.26 10*3/mm3      Monocytes, Absolute 0.52 10*3/mm3      Eosinophils, Absolute 0.21 10*3/mm3      Basophils, Absolute 0.09 10*3/mm3      Immature Grans, Absolute 0.01 10*3/mm3      nRBC 0.0 /100 WBC     Salicylate Level [[id number]]  (Normal) Collected: 03/21/23 1521    Specimen: Blood Updated: 03/21/23 1649     Salicylate <0.3 mg/dL     Acetaminophen Level [[id number]]  (Normal) Collected: 03/21/23 1521    Specimen: Blood Updated: 03/21/23 1649     Acetaminophen <5.0 mcg/mL     Extra Tubes [[id number]] Collected: 03/21/23 1521    Specimen: Blood Updated: 03/21/23 1631    Narrative:      The following orders were created for panel order Extra Tubes.  Procedure                               Abnormality         Status                     ---------                               -----------         ------                     Gold Top - SST[[id number]]                                    Final result               Light Blue Top[[id number]]                                    Final result                  Please view results for these tests on the individual orders.    Gold Top - SST [[id number]] Collected: 03/21/23 1521    Specimen: Blood Updated: 03/21/23 1631     Extra Tube Hold for add-ons.     Comment: Auto resulted.       Light Blue Top [[id number]] Collected: 03/21/23 1521    Specimen: Blood Updated: 03/21/23 1631     Extra Tube Hold for add-ons.     Comment: Auto resulted       COVID-19 and FLU A/B PCR - Swab, Nasopharynx [[id number]]  (Normal) Collected: 03/21/23 1555    Specimen: Swab from Nasopharynx Updated: 03/21/23 1623     COVID19 Not Detected     Influenza A PCR Not Detected     Influenza B PCR Not Detected    Narrative:      Fact sheet for providers: https://www.fda.gov/media/378563/download    Fact sheet for patients: https://www.fda.gov/media/314779/download    Test performed by PCR.    Urine Drug Screen - Urine, Clean Catch [[id number]]  (Abnormal) Collected: 03/21/23 1531    Specimen: Urine, Clean Catch Updated: 03/21/23 1606     THC, Screen, Urine Negative     Phencyclidine (PCP), Urine Negative     Cocaine Screen, Urine Negative     Methamphetamine, Ur Negative     Opiate Screen Positive     Amphetamine Screen, Urine Negative     Benzodiazepine Screen, Urine Negative     Tricyclic Antidepressants Screen Negative     Methadone Screen, Urine Negative     Barbiturates Screen, Urine Negative     Oxycodone Screen, Urine Negative     Propoxyphene Screen Negative     Buprenorphine, Screen, Urine Negative    Narrative:      Cutoff For Drugs Screened:    Amphetamines               500 ng/ml  Barbiturates               200 ng/ml  Benzodiazepines            150 ng/ml  Cocaine                    150 ng/ml  Methadone                  200 ng/ml  Opiates                    100 ng/ml  Phencyclidine               25 ng/ml  THC                            50 ng/ml  Methamphetamine            500 ng/ml  Tricyclic Antidepressants  300 ng/ml  Oxycodone                  100 ng/ml  Propoxyphene               300  ng/ml  Buprenorphine               10 ng/ml    The normal value for all drugs tested is negative. This report includes unconfirmed screening results, with the cutoff values listed, to be used for medical treatment purposes only.  Unconfirmed results must not be used for non-medical purposes such as employment or legal testing.  Clinical consideration should be applied to any drug of abuse test, particularly when unconfirmed results are used.      Lipase [[id number]]  (Abnormal) Collected: 03/21/23 1516    Specimen: Blood Updated: 03/21/23 1554     Lipase 78 U/L     Ethanol [[id number]] Collected: 03/21/23 1516    Specimen: Blood Updated: 03/21/23 1554     Ethanol <10 mg/dL      Ethanol % <0.010 %     CK [[id number]]  (Abnormal) Collected: 03/21/23 1516    Specimen: Blood Updated: 03/21/23 1554     Creatine Kinase 270 U/L     Magnesium [[id number]]  (Abnormal) Collected: 03/21/23 1516    Specimen: Blood Updated: 03/21/23 1554     Magnesium 1.4 mg/dL     Urinalysis, Microscopic Only - Urine, Clean Catch [[id number]]  (Abnormal) Collected: 03/21/23 1531    Specimen: Urine, Clean Catch Updated: 03/21/23 1552     RBC, UA 0-2 /HPF      WBC, UA 0-2 /HPF      Comment: Urine culture not indicated.        Bacteria, UA None Seen /HPF      Squamous Epithelial Cells, UA 3-5 /HPF      Hyaline Casts, UA None Seen /LPF      Methodology Automated Microscopy    Urinalysis With Culture If Indicated - Urine, Clean Catch [[id number]]  (Abnormal) Collected: 03/21/23 1531    Specimen: Urine, Clean Catch Updated: 03/21/23 1548     Color, UA Yellow     Appearance, UA Clear     pH, UA 5.5     Specific Gravity, UA 1.017     Glucose, UA Negative     Ketones, UA Trace     Bilirubin, UA Negative     Blood, UA Negative     Protein, UA Negative     Leuk Esterase, UA Trace     Nitrite, UA Negative     Urobilinogen, UA 1.0 E.U./dL    Narrative:      In absence of clinical symptoms, the presence of pyuria, bacteria, and/or nitrites on the urinalysis result  does not correlate with infection.    Comprehensive Metabolic Panel [[id number]]  (Abnormal) Collected: 03/21/23 1516    Specimen: Blood Updated: 03/21/23 1541     Glucose 129 mg/dL      BUN 12 mg/dL      Creatinine 0.65 mg/dL      Sodium 138 mmol/L      Potassium 3.1 mmol/L      Chloride 104 mmol/L      CO2 25.0 mmol/L      Calcium 9.1 mg/dL      Total Protein 6.5 g/dL      Albumin 3.8 g/dL      ALT (SGPT) 55 U/L      AST (SGOT) 68 U/L      Alkaline Phosphatase 99 U/L      Total Bilirubin 0.3 mg/dL      Globulin 2.7 gm/dL      A/G Ratio 1.4 g/dL      BUN/Creatinine Ratio 18.5     Anion Gap 9.0 mmol/L      eGFR 102.8 mL/min/1.73     Narrative:      GFR Normal >60  Chronic Kidney Disease <60  Kidney Failure <15      CBC & Differential [[id number]]  (Abnormal) Collected: 03/21/23 1516    Specimen: Blood Updated: 03/21/23 1526    Narrative:      The following orders were created for panel order CBC & Differential.  Procedure                               Abnormality         Status                     ---------                               -----------         ------                     CBC Auto Differential[[id number]]         Abnormal            Final result                 Please view results for these tests on the individual orders.        Imaging Results (Most Recent)     Procedure Component Value Units Date/Time    CT Head Without Contrast [[id number]] Collected: 03/21/23 1710     Updated: 03/21/23 1739    Narrative:      EXAM:  CT HEAD WITHOUT IV CONTRAST    ORDERING PROVIDER:  ALESSIO CHAMBERS    CLINICAL HISTORY:   Mental status change, unknown cause    COMPARISON:      TECHNIQUE:   Nonenhanced CT of the head was performed and reformatted in the  sagittal and coronal planes.     This examination was performed according to our departmental dose  optimization program which includes automated exposure control,  adjustment of the MA and kV according to patient size, and/or use  of iterative reconstruction  "technique.    FINDINGS:     CEREBRAL PARENCHYMA:  Chronic microangiopathic changes. No  hemorrhage.  No intracranial mass or mass effect. Age-appropriate  cerebral atrophy.    POSTERIOR FOSSA:  Age-appropriate atrophy of cerebellum and  brainstem.  No cerebellar tonsillar ectopia.    EXTRA-AXIAL SPACES:  Normal size and configuration.  No mass,  fluid collection or hemorrhage.    ORBITS: No mass. Unremarkable extraocular muscles, globe and  optic nerve.    CALVARIA AND SOFT TISSUES:  No mass or adenopathy, lytic or  sclerotic lesion.    TEMPORAL BONE AND SKULL BASE:  Unremarkable middle and inner ear  , and mastoid air cells.    PARANASAL SINUSES AND FACIAL BONES: Unremarkable.     VASCULAR STRUCTURES:  Unremarkable.      Impression:      1.  No acute intracranial process.  2.  Scattered chronic microangiopathic changes.          Electronically signed by:  Travis Tavarez MD  3/21/2023 5:37 PM CDT  Workstation: [id number]          Chief Complaint on Day of Discharge: none    Hospital Course:  The patient is a 57 y.o. female who presented to [facility] with history of alcohol abuse admitted for visual hallucinations which were resolved after admission; evaluated by psychiatrist and recommendations were followed. She would follow with outpatient psych unit;     Condition on Discharge:  none    Physical Exam on Discharge:  BP (!) 193/91   Pulse 68   Temp 97.2 °F (36.2 °C)   Resp 20   Ht 167.6 cm (66\")   Wt 81.7 kg (180 lb 1.6 oz)   SpO2 96%   BMI 29.07 kg/m²   Physical Exam  Vitals reviewed.   Constitutional:       Appearance: Normal appearance.   HENT:      Head: Normocephalic and atraumatic.      Nose: Nose normal.      Mouth/Throat:      Mouth: Mucous membranes are moist.   Eyes:      Extraocular Movements: Extraocular movements intact.   Cardiovascular:      Rate and Rhythm: Normal rate and regular rhythm.      Heart sounds: Normal heart sounds.   Pulmonary:      Effort: Pulmonary effort is normal. "      Breath sounds: Normal breath sounds.   Abdominal:      Palpations: Abdomen is soft.   Musculoskeletal:         General: Normal range of motion.      Cervical back: Normal range of motion and neck supple.   Skin:     General: Skin is warm.   Neurological:      General: No focal deficit present.      Mental Status: She is alert. Mental status is at baseline.   Psychiatric:         Mood and Affect: Mood normal.         Discharge Disposition:  Home or Self Care    Discharge Medications:     Discharge Medications      New Medications      Instructions Start Date   acamprosate 333 MG EC tablet  Commonly known as: CAMPRAL  Notes to patient: 03/24/2023   666 mg, Oral, 3 Times Daily         Continue These Medications      Instructions Start Date   5-Hydroxytryptophan 50 MG capsule  Notes to patient: As directed   Oral      ARIPiprazole 5 MG tablet  Commonly known as: ABILIFY  Notes to patient: 03/25/2023   5 mg, Oral, Daily      aspirin 81 MG chewable tablet  Notes to patient: 03/25/2023   81 mg, Oral, Daily      Black Cohosh 540 MG capsule  Notes to patient: 03/25/2023   Oral, Daily      fluticasone 50 MCG/ACT nasal spray  Commonly known as: FLONASE  Notes to patient: 03/25/2023   No dose, route, or frequency recorded.      HYDROcodone-acetaminophen 7.5-325 MG per tablet  Commonly known as: NORCO  Notes to patient: As needed   hydrocodone 7.5 mg-acetaminophen 325 mg tablet      lisinopril 10 MG tablet  Commonly known as: PRINIVILZESTRIL  Notes to patient: 03/25/2023   2 tablets.      metoprolol succinate XL 25 MG 24 hr tablet  Commonly known as: TOPROL-XL  Notes to patient: 03/25/2023   TAKE 1 TABLET BY MOUTH DAILY.      PARoxetine 20 MG tablet  Commonly known as: Paxil  Notes to patient: 03/25/2023   10 mg, Oral, Every Morning         Stop These Medications    Calcium Carbonate 1500 (600 Ca) MG tablet     cetirizine 10 MG tablet  Commonly known as: zyrTEC     hydrOXYzine pamoate 50 MG capsule  Commonly known as:  VISTARIL     omeprazole 40 MG capsule  Commonly known as: priLOSEC     promethazine 25 MG tablet  Commonly known as: PHENERGAN     tiZANidine 4 MG tablet  Commonly known as: ZANAFLEX             Discharge Diet:   Diet Instructions     Diet: Regular/House Diet; Texture: Regular Texture (IDDSI 7); Fluid Consistency: Thin (IDDSI 0)      Discharge Diet: Regular/House Diet    Texture: Regular Texture (IDDSI 7)    Fluid Consistency: Thin (IDDSI 0)          Activity at Discharge: as tolerated      Discharge Care Plan/Instructions: see chart    Follow-up Appointments:   No future appointments.    Test Results Pending at Discharge:     Michael Whitney MD    Time: 25 min

## 2023-03-24 NOTE — PLAN OF CARE
Goal Outcome Evaluation:              Outcome Evaluation: no acute changes this shift. vss. patient resting in between care. patient hopeful of discharge today!

## 2023-03-27 NOTE — PAYOR COMM NOTE
"Sandy Cortezmore  [facility]  Case Management Extender  [phone] phone  [phone] fax      Auth# [id number]    Freda Dumont (57 y.o. Female)     Date of Birth   1965    Social Security Number       Address   [address] [facility] [address]    Home Phone   [phone]    MRN   [id number]       Anabaptism   Non-Congregational    Marital Status                               Admission Date   3/21/23    Admission Type   Emergency    Admitting Provider   Michael Whitney MD    Attending Provider       Department, Room/Bed   [facility] 3 EAST, 368/1       Discharge Date   3/24/2023    Discharge Disposition   Home or Self Care    Discharge Destination                               Attending Provider: (none)   Allergies: Ativan [Lorazepam], Codeine, Demerol [Meperidine], Ibuprofen, Tramadol, Latex    Isolation: None   Infection: None   Code Status: Prior    Ht: 167.6 cm (66\")   Wt: 81.7 kg (180 lb 1.6 oz)    Admission Cmt: None   Principal Problem: Delirium tremens (HCC) [F10.931]                 Active Insurance as of 3/21/2023     Primary Coverage     Payor Plan Insurance Group Employer/Plan Group    HUMANA MEDICARE REPLACEMENT HUMANA MEDICARE REPLACEMENT [id number]     Payor Plan Address Payor Plan Phone Number Payor Plan Fax Number Effective Dates    [address] [address] [phone]  1/1/2018 - None Entered    [facility] [address]       Subscriber Name Subscriber Birth Date Member ID       FREDA DUMONT BRE 1965 [id number]           Secondary Coverage     Payor Plan Insurance Group Employer/Plan Group    [place]      Payor Plan Address Payor Plan Phone Number Payor Plan Fax Number Effective Dates    [address] [phone]  7/22/2016 - None Entered    [facility] [address]       Subscriber Name Subscriber Birth Date Member ID       FREDA DUMONT ZAKGEOFFREY 1965 [id number]     "             Emergency Contacts      (Rel.) Home Phone Work Phone Mobile Phone    Gladis Jones (Sister) [phone] -- [phone]    Melissa Villa (Sister) -- [phone]               Discharge Summary      Michael Whitney MD at 03/24/23 0916              [facility] Medicine Services  DISCHARGE SUMMARY       Date of Admission: 3/21/2023  Date of Discharge:  3/24/2023  Primary Care Physician: Jayy Johnson MD    Presenting Problem/History of Present Illness:  Hallucinations [R44.3]  Hypokalemia [E87.6]  Hypomagnesemia [E83.42]  Altered mental status, unspecified altered mental status type [R41.82]  Alcohol withdrawal (HCC) [F10.939]       Final Discharge Diagnoses:  Alcohol use disorder with withdrawal     With visual hallucinations which are not present      CT brain negative for an acute event     shed did not present alcohol withdrawal     Chronic medical problems     Chronic recurrent depression     Generalized anxiety disorder     Essential hypertension     Hx of suicide attempts    Consults:   Consults     Date and Time Order Name Status Description    3/21/2023  7:10 PM Hospitalist (on-call MD unless specified)      3/21/2023  7:10 PM Psychiatry (on-call MD unless specified) Completed           Procedures Performed:                 Pertinent Test Results:   Lab Results (most recent)     Procedure Component Value Units Date/Time    Potassium [[id number]]  (Normal) Collected: 03/24/23 0553    Specimen: Blood Updated: 03/24/23 0741     Potassium 3.5 mmol/L     Extra Tubes [[id number]] Collected: 03/24/23 0553    Specimen: Blood, Venous Line Updated: 03/24/23 0715    Narrative:      The following orders were created for panel order Extra Tubes.  Procedure                               Abnormality         Status                     ---------                               -----------         ------                     Shruthi  Top[[id number]]                                     Final result                 Please view results for these tests on the individual orders.    Lavender Top [[id number]] Collected: 03/24/23 0553    Specimen: Blood Updated: 03/24/23 0715     Extra Tube hold for add-on     Comment: Auto resulted       Potassium [[id number]]  (Normal) Collected: 03/23/23 0125    Specimen: Blood Updated: 03/23/23 0159     Potassium 4.0 mmol/L     TSH [[id number]]  (Abnormal) Collected: 03/22/23 0549    Specimen: Blood Updated: 03/22/23 0655     TSH 5.560 uIU/mL     Magnesium [[id number]]  (Normal) Collected: 03/22/23 0549    Specimen: Blood Updated: 03/22/23 0652     Magnesium 2.0 mg/dL     Comprehensive Metabolic Panel [[id number]]  (Abnormal) Collected: 03/22/23 0549    Specimen: Blood Updated: 03/22/23 0652     Glucose 111 mg/dL      BUN 10 mg/dL      Creatinine 0.56 mg/dL      Sodium 144 mmol/L      Potassium 3.3 mmol/L      Chloride 112 mmol/L      CO2 23.0 mmol/L      Calcium 8.4 mg/dL      Total Protein 5.4 g/dL      Albumin 3.2 g/dL      ALT (SGPT) 44 U/L      AST (SGOT) 58 U/L      Alkaline Phosphatase 80 U/L      Total Bilirubin 0.4 mg/dL      Globulin 2.2 gm/dL      A/G Ratio 1.5 g/dL      BUN/Creatinine Ratio 17.9     Anion Gap 9.0 mmol/L      eGFR 106.6 mL/min/1.73     Narrative:      GFR Normal >60  Chronic Kidney Disease <60  Kidney Failure <15      CBC Auto Differential [[id number]]  (Abnormal) Collected: 03/22/23 0549    Specimen: Blood Updated: 03/22/23 0628     WBC 4.34 10*3/mm3      RBC 3.36 10*6/mm3      Hemoglobin 11.3 g/dL      Hematocrit 32.4 %      MCV 96.4 fL      MCH 33.6 pg      MCHC 34.9 g/dL      RDW 14.4 %      RDW-SD 50.8 fl      MPV 11.9 fL      Platelets 135 10*3/mm3      Neutrophil % 47.7 %      Lymphocyte % 37.8 %      Monocyte % 8.3 %      Eosinophil % 4.1 %      Basophil % 1.6 %      Immature Grans % 0.5 %      Neutrophils, Absolute 2.07 10*3/mm3      Lymphocytes, Absolute 1.64 10*3/mm3      Monocytes,  Absolute 0.36 10*3/mm3      Eosinophils, Absolute 0.18 10*3/mm3      Basophils, Absolute 0.07 10*3/mm3      Immature Grans, Absolute 0.02 10*3/mm3      nRBC 0.0 /100 WBC     CBC & Differential [[id number]]  (Abnormal) Collected: 03/21/23 2057    Specimen: Blood Updated: 03/21/23 2103    Narrative:      The following orders were created for panel order CBC & Differential.  Procedure                               Abnormality         Status                     ---------                               -----------         ------                     CBC Auto Differential[[id number]]        Abnormal            Final result                 Please view results for these tests on the individual orders.    CBC Auto Differential [[id number]]  (Abnormal) Collected: 03/21/23 2057    Specimen: Blood Updated: 03/21/23 2103     WBC 5.22 10*3/mm3      RBC 3.39 10*6/mm3      Hemoglobin 11.5 g/dL      Hematocrit 32.8 %      MCV 96.8 fL      MCH 33.9 pg      MCHC 35.1 g/dL      RDW 14.6 %      RDW-SD 51.8 fl      MPV 11.4 fL      Platelets 137 10*3/mm3      Neutrophil % 40.8 %      Lymphocyte % 43.3 %      Monocyte % 10.0 %      Eosinophil % 4.0 %      Basophil % 1.7 %      Immature Grans % 0.2 %      Neutrophils, Absolute 2.13 10*3/mm3      Lymphocytes, Absolute 2.26 10*3/mm3      Monocytes, Absolute 0.52 10*3/mm3      Eosinophils, Absolute 0.21 10*3/mm3      Basophils, Absolute 0.09 10*3/mm3      Immature Grans, Absolute 0.01 10*3/mm3      nRBC 0.0 /100 WBC     Salicylate Level [[id number]]  (Normal) Collected: 03/21/23 1521    Specimen: Blood Updated: 03/21/23 1649     Salicylate <0.3 mg/dL     Acetaminophen Level [[id number]]  (Normal) Collected: 03/21/23 1521    Specimen: Blood Updated: 03/21/23 1649     Acetaminophen <5.0 mcg/mL     Extra Tubes [[id number]] Collected: 03/21/23 1521    Specimen: Blood Updated: 03/21/23 1631    Narrative:      The following orders were created for panel order Extra Tubes.  Procedure                                Abnormality         Status                     ---------                               -----------         ------                     Gold Top - SST[[id number]]                                    Final result               Light Blue Top[[id number]]                                    Final result                 Please view results for these tests on the individual orders.    Gold Top - SST [[id number]] Collected: 03/21/23 1521    Specimen: Blood Updated: 03/21/23 1631     Extra Tube Hold for add-ons.     Comment: Auto resulted.       Light Blue Top [[id number]] Collected: 03/21/23 1521    Specimen: Blood Updated: 03/21/23 1631     Extra Tube Hold for add-ons.     Comment: Auto resulted       COVID-19 and FLU A/B PCR - Swab, Nasopharynx [[id number]]  (Normal) Collected: 03/21/23 1555    Specimen: Swab from Nasopharynx Updated: 03/21/23 1623     COVID19 Not Detected     Influenza A PCR Not Detected     Influenza B PCR Not Detected    Narrative:      Fact sheet for providers: https://www.fda.gov/media/181167/download    Fact sheet for patients: https://www.fda.gov/media/458050/download    Test performed by PCR.    Urine Drug Screen - Urine, Clean Catch [[id number]]  (Abnormal) Collected: 03/21/23 1531    Specimen: Urine, Clean Catch Updated: 03/21/23 1606     THC, Screen, Urine Negative     Phencyclidine (PCP), Urine Negative     Cocaine Screen, Urine Negative     Methamphetamine, Ur Negative     Opiate Screen Positive     Amphetamine Screen, Urine Negative     Benzodiazepine Screen, Urine Negative     Tricyclic Antidepressants Screen Negative     Methadone Screen, Urine Negative     Barbiturates Screen, Urine Negative     Oxycodone Screen, Urine Negative     Propoxyphene Screen Negative     Buprenorphine, Screen, Urine Negative    Narrative:      Cutoff For Drugs Screened:    Amphetamines               500 ng/ml  Barbiturates               200 ng/ml  Benzodiazepines            150 ng/ml  Cocaine                    150  ng/ml  Methadone                  200 ng/ml  Opiates                    100 ng/ml  Phencyclidine               25 ng/ml  THC                            50 ng/ml  Methamphetamine            500 ng/ml  Tricyclic Antidepressants  300 ng/ml  Oxycodone                  100 ng/ml  Propoxyphene               300 ng/ml  Buprenorphine               10 ng/ml    The normal value for all drugs tested is negative. This report includes unconfirmed screening results, with the cutoff values listed, to be used for medical treatment purposes only.  Unconfirmed results must not be used for non-medical purposes such as employment or legal testing.  Clinical consideration should be applied to any drug of abuse test, particularly when unconfirmed results are used.      Lipase [[id number]]  (Abnormal) Collected: 03/21/23 1516    Specimen: Blood Updated: 03/21/23 1554     Lipase 78 U/L     Ethanol [[id number]] Collected: 03/21/23 1516    Specimen: Blood Updated: 03/21/23 1554     Ethanol <10 mg/dL      Ethanol % <0.010 %     CK [[id number]]  (Abnormal) Collected: 03/21/23 1516    Specimen: Blood Updated: 03/21/23 1554     Creatine Kinase 270 U/L     Magnesium [[id number]]  (Abnormal) Collected: 03/21/23 1516    Specimen: Blood Updated: 03/21/23 1554     Magnesium 1.4 mg/dL     Urinalysis, Microscopic Only - Urine, Clean Catch [[id number]]  (Abnormal) Collected: 03/21/23 1531    Specimen: Urine, Clean Catch Updated: 03/21/23 1552     RBC, UA 0-2 /HPF      WBC, UA 0-2 /HPF      Comment: Urine culture not indicated.        Bacteria, UA None Seen /HPF      Squamous Epithelial Cells, UA 3-5 /HPF      Hyaline Casts, UA None Seen /LPF      Methodology Automated Microscopy    Urinalysis With Culture If Indicated - Urine, Clean Catch [[id number]]  (Abnormal) Collected: 03/21/23 1531    Specimen: Urine, Clean Catch Updated: 03/21/23 1548     Color, UA Yellow     Appearance, UA Clear     pH, UA 5.5     Specific Gravity, UA 1.017     Glucose, UA Negative      Ketones, UA Trace     Bilirubin, UA Negative     Blood, UA Negative     Protein, UA Negative     Leuk Esterase, UA Trace     Nitrite, UA Negative     Urobilinogen, UA 1.0 E.U./dL    Narrative:      In absence of clinical symptoms, the presence of pyuria, bacteria, and/or nitrites on the urinalysis result does not correlate with infection.    Comprehensive Metabolic Panel [[id number]]  (Abnormal) Collected: 03/21/23 1516    Specimen: Blood Updated: 03/21/23 1541     Glucose 129 mg/dL      BUN 12 mg/dL      Creatinine 0.65 mg/dL      Sodium 138 mmol/L      Potassium 3.1 mmol/L      Chloride 104 mmol/L      CO2 25.0 mmol/L      Calcium 9.1 mg/dL      Total Protein 6.5 g/dL      Albumin 3.8 g/dL      ALT (SGPT) 55 U/L      AST (SGOT) 68 U/L      Alkaline Phosphatase 99 U/L      Total Bilirubin 0.3 mg/dL      Globulin 2.7 gm/dL      A/G Ratio 1.4 g/dL      BUN/Creatinine Ratio 18.5     Anion Gap 9.0 mmol/L      eGFR 102.8 mL/min/1.73     Narrative:      GFR Normal >60  Chronic Kidney Disease <60  Kidney Failure <15      CBC & Differential [[id number]]  (Abnormal) Collected: 03/21/23 1516    Specimen: Blood Updated: 03/21/23 1526    Narrative:      The following orders were created for panel order CBC & Differential.  Procedure                               Abnormality         Status                     ---------                               -----------         ------                     CBC Auto Differential[[id number]]         Abnormal            Final result                 Please view results for these tests on the individual orders.        Imaging Results (Most Recent)     Procedure Component Value Units Date/Time    CT Head Without Contrast [[id number]] Collected: 03/21/23 1710     Updated: 03/21/23 1739    Narrative:      EXAM:  CT HEAD WITHOUT IV CONTRAST    ORDERING PROVIDER:  ALESSIO CHAMBERS    CLINICAL HISTORY:   Mental status change, unknown cause    COMPARISON:      TECHNIQUE:   Nonenhanced CT of the head was  "performed and reformatted in the  sagittal and coronal planes.     This examination was performed according to our departmental dose  optimization program which includes automated exposure control,  adjustment of the MA and kV according to patient size, and/or use  of iterative reconstruction technique.    FINDINGS:     CEREBRAL PARENCHYMA:  Chronic microangiopathic changes. No  hemorrhage.  No intracranial mass or mass effect. Age-appropriate  cerebral atrophy.    POSTERIOR FOSSA:  Age-appropriate atrophy of cerebellum and  brainstem.  No cerebellar tonsillar ectopia.    EXTRA-AXIAL SPACES:  Normal size and configuration.  No mass,  fluid collection or hemorrhage.    ORBITS: No mass. Unremarkable extraocular muscles, globe and  optic nerve.    CALVARIA AND SOFT TISSUES:  No mass or adenopathy, lytic or  sclerotic lesion.    TEMPORAL BONE AND SKULL BASE:  Unremarkable middle and inner ear  , and mastoid air cells.    PARANASAL SINUSES AND FACIAL BONES: Unremarkable.     VASCULAR STRUCTURES:  Unremarkable.      Impression:      1.  No acute intracranial process.  2.  Scattered chronic microangiopathic changes.          Electronically signed by:  Travis Tavarez MD  3/21/2023 5:37 PM CDT  Workstation: [id number]          Chief Complaint on Day of Discharge: none    Hospital Course:  The patient is a 57 y.o. female who presented to [facility] with history of alcohol abuse admitted for visual hallucinations which were resolved after admission; evaluated by psychiatrist and recommendations were followed. She would follow with outpatient psych unit;     Condition on Discharge:  none    Physical Exam on Discharge:  BP (!) 193/91   Pulse 68   Temp 97.2 °F (36.2 °C)   Resp 20   Ht 167.6 cm (66\")   Wt 81.7 kg (180 lb 1.6 oz)   SpO2 96%   BMI 29.07 kg/m²   Physical Exam  Vitals reviewed.   Constitutional:       Appearance: Normal appearance.   HENT:      Head: Normocephalic and atraumatic.      Nose: Nose " normal.      Mouth/Throat:      Mouth: Mucous membranes are moist.   Eyes:      Extraocular Movements: Extraocular movements intact.   Cardiovascular:      Rate and Rhythm: Normal rate and regular rhythm.      Heart sounds: Normal heart sounds.   Pulmonary:      Effort: Pulmonary effort is normal.      Breath sounds: Normal breath sounds.   Abdominal:      Palpations: Abdomen is soft.   Musculoskeletal:         General: Normal range of motion.      Cervical back: Normal range of motion and neck supple.   Skin:     General: Skin is warm.   Neurological:      General: No focal deficit present.      Mental Status: She is alert. Mental status is at baseline.   Psychiatric:         Mood and Affect: Mood normal.         Discharge Disposition:  Home or Self Care    Discharge Medications:     Discharge Medications      New Medications      Instructions Start Date   acamprosate 333 MG EC tablet  Commonly known as: CAMPRAL  Notes to patient: 03/24/2023   666 mg, Oral, 3 Times Daily         Continue These Medications      Instructions Start Date   5-Hydroxytryptophan 50 MG capsule  Notes to patient: As directed   Oral      ARIPiprazole 5 MG tablet  Commonly known as: ABILIFY  Notes to patient: 03/25/2023   5 mg, Oral, Daily      aspirin 81 MG chewable tablet  Notes to patient: 03/25/2023   81 mg, Oral, Daily      Black Cohosh 540 MG capsule  Notes to patient: 03/25/2023   Oral, Daily      fluticasone 50 MCG/ACT nasal spray  Commonly known as: FLONASE  Notes to patient: 03/25/2023   No dose, route, or frequency recorded.      HYDROcodone-acetaminophen 7.5-325 MG per tablet  Commonly known as: NORCO  Notes to patient: As needed   hydrocodone 7.5 mg-acetaminophen 325 mg tablet      lisinopril 10 MG tablet  Commonly known as: PRINJIMENAZESTRIL  Notes to patient: 03/25/2023   2 tablets.      metoprolol succinate XL 25 MG 24 hr tablet  Commonly known as: TOPROL-XL  Notes to patient: 03/25/2023   TAKE 1 TABLET BY MOUTH DAILY.       PARoxetine 20 MG tablet  Commonly known as: Paxil  Notes to patient: 03/25/2023   10 mg, Oral, Every Morning         Stop These Medications    Calcium Carbonate 1500 (600 Ca) MG tablet     cetirizine 10 MG tablet  Commonly known as: zyrTEC     hydrOXYzine pamoate 50 MG capsule  Commonly known as: VISTARIL     omeprazole 40 MG capsule  Commonly known as: priLOSEC     promethazine 25 MG tablet  Commonly known as: PHENERGAN     tiZANidine 4 MG tablet  Commonly known as: ZANAFLEX             Discharge Diet:   Diet Instructions     Diet: Regular/House Diet; Texture: Regular Texture (IDDSI 7); Fluid Consistency: Thin (IDDSI 0)      Discharge Diet: Regular/House Diet    Texture: Regular Texture (IDDSI 7)    Fluid Consistency: Thin (IDDSI 0)          Activity at Discharge: as tolerated      Discharge Care Plan/Instructions: see chart    Follow-up Appointments:   No future appointments.    Test Results Pending at Discharge:     Beryl Crooks MD    Time: 25 min                Electronically signed by Beryl Crooks MD at 03/24/23 1756       Discharge Order (From admission, onward)     Start     Ordered    03/24/23 0857  Discharge patient  Once        Expected Discharge Date: 03/24/23    Expected Discharge Time: Morning    Discharge Disposition: Home or Self Care    Physician of Record for Attribution - Please select from Treatment Team: BERYL CROOKS [[id number]]    Review needed by CMO to determine Physician of Record: No       Question Answer Comment   Physician of Record for Attribution - Please select from Treatment Team BERYL CROOKS    Review needed by CMO to determine Physician of Record No        03/24/23 0856

## 2024-09-12 NOTE — CONSULTS
CHIEF COMPLAINT/REASON FOR VISIT:  Suicide Attempt    HPI:  Patient presented to our ED with the above complaint on February 4 with the history that she had taken a very large amount of prescribed medications including Soma and Xanax.  He was found by family unresponsive with slurred speech and incontinent of urine.  She tells me this morning that she has bruises all over and she was falling around a lot during these 3 days.  To some observers she implied suicidal ideation and to others she did not.    PROBLEM LIST:  Patient Active Problem List    Diagnosis   • Severe episode of recurrent major depressive disorder, without psychotic features [F33.2]   • Social anxiety disorder [F40.10]   • Suicidal overdose [T50.902A]   • Overdose [T50.901A]   • Essential hypertension [I10]   • Precordial pain [R07.2]         CURRENT MEDICATIONS:  Prescriptions Prior to Admission   Medication Sig Dispense Refill Last Dose   • FLUoxetine (PROzac) 40 MG capsule    2/4/2017 at Unknown time   • gabapentin (NEURONTIN) 600 MG tablet Take 0.5 tablets by mouth 3 (Three) Times a Day. 60 tablet 0 2/4/2017 at Unknown time   • ibuprofen (ADVIL,MOTRIN) 600 MG tablet TAKE 1 TABLET BY MOUTH EVERY SIX HOURS WITH FOOD AS NEEDED FOR PAIN   Past Week at Unknown time   • lisinopril (PRINIVIL,ZESTRIL) 10 MG tablet TAKE 1 TABLET BY MOUTH DAILY.   2/4/2017 at Unknown time   • metoprolol succinate XL (TOPROL-XL) 25 MG 24 hr tablet TAKE 1 TABLET BY MOUTH DAILY.   2/4/2017 at Unknown time   • nicotine (NICODERM CQ) 14 MG/24HR patch Place 1 patch on the skin Daily. 1 patch 0 2/5/2017 at Unknown time   • omeprazole (priLOSEC) 40 MG capsule TAKE 1 CAPSULE BY MOUTH DAILY.   2/4/2017 at Unknown time   • mometasone (NASONEX) 50 MCG/ACT nasal spray SPRAY 2 SPRAYS IN EACH NOSTRIL EVERY DAY   Unknown at Unknown time   • promethazine (PHENERGAN) 25 MG tablet Take 25 mg by mouth Every 6 (Six) Hours.   Unknown at Unknown time       ALLERGIES:  Demerol [meperidine] and  Patient called and would like to speak to a provider reg. Being able to use UNISOM pills for sleep while ob. Please call patient to discuss.    Tramadol      PAST MEDICAL/SURGICAL HISTORY:  Past Medical History   Diagnosis Date   • Anxiety    • Depression    • Hypertension    • Menopause        Past Surgical History   Procedure Laterality Date   • Tonsillectomy     • Tubal abdominal ligation     • Bladder surgery       bladder stretched as a child       Review of Systems   Constitutional: Positive for fatigue. Negative for activity change, appetite change and fever.   HENT: Positive for congestion and postnasal drip. Negative for ear discharge, ear pain, facial swelling, hearing loss, nosebleeds, rhinorrhea, sinus pressure, sore throat, tinnitus and trouble swallowing.    Eyes: Negative for pain, discharge and visual disturbance.   Respiratory: Negative for cough, shortness of breath and wheezing.    Cardiovascular: Positive for palpitations. Negative for chest pain and leg swelling.   Gastrointestinal: Positive for abdominal pain. Negative for blood in stool, constipation, diarrhea, nausea and vomiting.   Genitourinary: Negative for difficulty urinating, dyspareunia, dysuria, flank pain, frequency, hematuria, menstrual problem, pelvic pain, urgency, vaginal bleeding and vaginal discharge.   Musculoskeletal: Positive for arthralgias, back pain, myalgias and neck pain. Negative for joint swelling.   Skin: Negative for rash and wound.   Allergic/Immunologic: Positive for environmental allergies.   Neurological: Positive for headaches. Negative for dizziness, seizures, syncope, weakness and light-headedness.   Hematological: Negative for adenopathy.   Psychiatric/Behavioral: Positive for sleep disturbance and suicidal ideas.       Social History     Social History   • Marital status:      Spouse name: Randy   • Number of children: 1   • Years of education: 12     Occupational History   • disabled      Social History Main Topics   • Smoking status: Current Every Day Smoker     Packs/day: 1.00     Years: 36.00     Types: Cigarettes   • Smokeless  "tobacco: Never Used   • Alcohol use No      Comment: Last use of alcohol 2006   • Drug use: No      Comment: Marijuana and amphetamines in high school . In high school took grandfather's valium.   • Sexual activity: Yes     Partners: Male     Other Topics Concern   • Not on file     Social History Narrative    Has had one AI and one DUI       Family History   Problem Relation Age of Onset   • Chronic infections Mother      became delirious--septic   • Diabetes Mother    • Bipolar disorder Mother    • Transient ischemic attack Father      [facility] [place]   • Alcohol abuse Father    • Seizures Father    • Dementia Father    • Cancer Maternal Grandmother    • Stroke Paternal Grandmother    • Diabetes Paternal Grandmother    • Heart disease Paternal Grandfather    • Lung cancer Maternal Uncle    • Lung cancer Maternal Aunt    • Drug abuse Sister      Accidental overdose             Objective     Visit Vitals   • /84 (BP Location: Left arm, Patient Position: Sitting)   • Pulse 70   • Temp 96.9 °F (36.1 °C) (Tympanic)   • Resp 18   • Ht 66\" (167.6 cm)   • Wt 183 lb 6.4 oz (83.2 kg)   • LMP Comment: Haven't had a period in 3 years, pt says she is going through Menopause.   • SpO2 97%   • Breastfeeding No   • BMI 29.6 kg/m2       Physical Exam   Constitutional: She appears well-developed and well-nourished.   HENT:   Head: Normocephalic and atraumatic.   Eyes: Conjunctivae and EOM are normal.   Neck: Normal range of motion. Neck supple. No thyromegaly present.   Cardiovascular: Normal rate, regular rhythm and normal heart sounds.  Exam reveals no gallop and no friction rub.    No murmur heard.  Pulmonary/Chest: Effort normal and breath sounds normal. No respiratory distress. She has no wheezes. She has no rales.   Abdominal: Soft. She exhibits no distension and no mass. There is tenderness. There is no rebound and no guarding.   Diffuse tenderness that appears to be in the abdominal wall only.   Musculoskeletal: " Normal range of motion.   Lymphadenopathy:     She has no cervical adenopathy.   Neurological: She is alert. She has normal strength and normal reflexes. She displays no tremor and normal reflexes. No cranial nerve deficit or sensory deficit. She exhibits normal muscle tone. Coordination normal. She displays no Babinski's sign on the right side. She displays no Babinski's sign on the left side.   Reflex Scores:       Tricep reflexes are 2+ on the right side and 2+ on the left side.       Bicep reflexes are 2+ on the right side and 2+ on the left side.       Brachioradialis reflexes are 2+ on the right side and 2+ on the left side.       Patellar reflexes are 2+ on the right side and 2+ on the left side.       Achilles reflexes are 2+ on the right side and 2+ on the left side.  Skin: Skin is warm and dry. No rash noted. No erythema.   Multiple bruises of 1x 2 CM scattered across arms and legs.   Nursing note and vitals reviewed.      Dystonia/Tardive Dyskinesia  Absent  Meningeal Signs  Absent    Diagnostic Studies  CBC, CMP, UDS, acetaminophen level, salicylate level, ethanol level, U/A all normal except  COMPREHENSIVE METABOLIC PANEL - Abnormal; Notable for the following:    Result Value      Glucose 110 (*)       Sodium 134 (*)       All other components within normal limits   ACETAMINOPHEN LEVEL - Abnormal; Notable for the following:      Acetaminophen <10.0 (*)       All other components within normal limits   ETHANOL - Abnormal; Notable for the following:      Ethanol <10 (*)       All other components within normal limits   SALICYLATE LEVEL - Abnormal; Notable for the following:      Salicylate <1.0 (*)       All other components within normal limits   URINE DRUG SCREEN - Abnormal; Notable for the following:      Benzodiazepine Screen, Urine Positive (*)       All other components within normal limits     CT Head Without Contrast   Final Result   No CT evidence of acute intracranial hemorrhage.              Assessment/Plan     Patient Active Problem List    Diagnosis   • Severe episode of recurrent major depressive disorder, without psychotic features [F33.2]   • Social anxiety disorder [F40.10]   • Suicidal overdose [T50.902A]   • Overdose [T50.901A]   • Essential hypertension [I10]   • Precordial pain [R07.2]           Continue Home Meds as ordered. Mental health and pain issues managed per psychiatry.  Further diagnostic studies or intervention based on hospital course.